# Patient Record
Sex: FEMALE | Race: WHITE | NOT HISPANIC OR LATINO | Employment: FULL TIME | URBAN - METROPOLITAN AREA
[De-identification: names, ages, dates, MRNs, and addresses within clinical notes are randomized per-mention and may not be internally consistent; named-entity substitution may affect disease eponyms.]

---

## 2017-01-30 ENCOUNTER — TRANSCRIBE ORDERS (OUTPATIENT)
Dept: URGENT CARE | Age: 37
End: 2017-01-30

## 2017-01-30 ENCOUNTER — OFFICE VISIT (OUTPATIENT)
Dept: URGENT CARE | Age: 37
End: 2017-01-30
Payer: COMMERCIAL

## 2017-01-30 ENCOUNTER — APPOINTMENT (OUTPATIENT)
Dept: LAB | Age: 37
End: 2017-01-30
Payer: COMMERCIAL

## 2017-01-30 DIAGNOSIS — R68.89 OTHER GENERAL SYMPTOMS AND SIGNS: ICD-10-CM

## 2017-01-30 PROCEDURE — S9083 URGENT CARE CENTER GLOBAL: HCPCS | Performed by: FAMILY MEDICINE

## 2017-01-30 PROCEDURE — 87798 DETECT AGENT NOS DNA AMP: CPT

## 2017-01-30 PROCEDURE — G0382 LEV 3 HOSP TYPE B ED VISIT: HCPCS | Performed by: FAMILY MEDICINE

## 2017-01-31 ENCOUNTER — GENERIC CONVERSION - ENCOUNTER (OUTPATIENT)
Dept: OTHER | Facility: OTHER | Age: 37
End: 2017-01-31

## 2017-01-31 LAB
FLUAV AG SPEC QL: DETECTED
FLUBV AG SPEC QL: ABNORMAL
RSV B RNA SPEC QL NAA+PROBE: ABNORMAL

## 2017-05-03 ENCOUNTER — HOSPITAL ENCOUNTER (OUTPATIENT)
Dept: MAMMOGRAPHY | Facility: MEDICAL CENTER | Age: 37
Discharge: HOME | End: 2017-05-03
Payer: COMMERCIAL

## 2017-05-03 DIAGNOSIS — Z80.3 FAMILY HISTORY OF MALIGNANT NEOPLASM OF BREAST: ICD-10-CM

## 2017-05-03 DIAGNOSIS — Z12.31 ENCOUNTER FOR SCREENING MAMMOGRAM FOR MALIGNANT NEOPLASM OF BREAST: ICD-10-CM

## 2017-05-03 DIAGNOSIS — R92.2 INCONCLUSIVE MAMMOGRAM: ICD-10-CM

## 2017-05-03 PROCEDURE — G0202 SCR MAMMO BI INCL CAD: HCPCS

## 2017-05-03 PROCEDURE — 77063 BREAST TOMOSYNTHESIS BI: CPT

## 2017-05-13 ENCOUNTER — APPOINTMENT (EMERGENCY)
Dept: RADIOLOGY | Facility: HOSPITAL | Age: 37
End: 2017-05-13
Payer: COMMERCIAL

## 2017-05-13 ENCOUNTER — HOSPITAL ENCOUNTER (EMERGENCY)
Facility: HOSPITAL | Age: 37
Discharge: HOME | End: 2017-05-13
Attending: EMERGENCY MEDICINE | Admitting: EMERGENCY MEDICINE
Payer: COMMERCIAL

## 2017-05-13 VITALS
HEART RATE: 71 BPM | SYSTOLIC BLOOD PRESSURE: 98 MMHG | DIASTOLIC BLOOD PRESSURE: 53 MMHG | TEMPERATURE: 98.1 F | BODY MASS INDEX: 23.82 KG/M2 | RESPIRATION RATE: 18 BRPM | OXYGEN SATURATION: 98 % | HEIGHT: 65 IN | WEIGHT: 143 LBS

## 2017-05-13 DIAGNOSIS — R10.9 ABDOMINAL PAIN: Primary | ICD-10-CM

## 2017-05-13 DIAGNOSIS — N93.9 VAGINAL BLEEDING: ICD-10-CM

## 2017-05-13 LAB
ANION GAP BLD CALC-SCNC: 19 MMOL/L (ref 4–13)
BACTERIA UR QL AUTO: ABNORMAL /HPF
BILIRUB UR QL STRIP: NEGATIVE
BUN BLD-MCNC: 15 MG/DL (ref 5–25)
CA-I BLD-SCNC: 1.12 MMOL/L (ref 1.12–1.32)
CHLORIDE BLD-SCNC: 105 MMOL/L (ref 100–108)
CLARITY UR: CLEAR
COLOR UR: YELLOW
CREAT BLD-MCNC: 0.6 MG/DL (ref 0.6–1.3)
GFR SERPL CREATININE-BSD FRML MDRD: >60 ML/MIN/1.73SQ M
GLUCOSE SERPL-MCNC: 97 MG/DL (ref 65–140)
GLUCOSE UR STRIP-MCNC: NEGATIVE MG/DL
HCG UR QL: NORMAL
HCT VFR BLD CALC: 41 % (ref 34.8–46.1)
HGB BLDA-MCNC: 13.9 G/DL (ref 11.5–15.4)
HGB UR QL STRIP.AUTO: ABNORMAL
KETONES UR STRIP-MCNC: NEGATIVE MG/DL
LEUKOCYTE ESTERASE UR QL STRIP: NEGATIVE
NITRITE UR QL STRIP: NEGATIVE
NON-SQ EPI CELLS URNS QL MICRO: ABNORMAL /HPF
PCO2 BLD: 23 MMOL/L (ref 21–32)
PH UR STRIP.AUTO: 5.5 [PH] (ref 4.5–8)
POTASSIUM BLD-SCNC: 3.7 MMOL/L (ref 3.5–5.3)
PROT UR STRIP-MCNC: NEGATIVE MG/DL
RBC #/AREA URNS AUTO: ABNORMAL /HPF
SODIUM BLD-SCNC: 141 MMOL/L (ref 136–145)
SP GR UR STRIP.AUTO: 1.02 (ref 1–1.03)
SPECIMEN SOURCE: ABNORMAL
UROBILINOGEN UR QL STRIP.AUTO: 0.2 E.U./DL
WBC #/AREA URNS AUTO: ABNORMAL /HPF

## 2017-05-13 PROCEDURE — 81025 URINE PREGNANCY TEST: CPT | Performed by: EMERGENCY MEDICINE

## 2017-05-13 PROCEDURE — 87591 N.GONORRHOEAE DNA AMP PROB: CPT | Performed by: EMERGENCY MEDICINE

## 2017-05-13 PROCEDURE — 99284 EMERGENCY DEPT VISIT MOD MDM: CPT

## 2017-05-13 PROCEDURE — 81001 URINALYSIS AUTO W/SCOPE: CPT

## 2017-05-13 PROCEDURE — 93975 VASCULAR STUDY: CPT

## 2017-05-13 PROCEDURE — 87491 CHLMYD TRACH DNA AMP PROBE: CPT | Performed by: EMERGENCY MEDICINE

## 2017-05-13 PROCEDURE — 80047 BASIC METABLC PNL IONIZED CA: CPT

## 2017-05-13 PROCEDURE — 76856 US EXAM PELVIC COMPLETE: CPT

## 2017-05-13 PROCEDURE — 85014 HEMATOCRIT: CPT

## 2017-05-13 PROCEDURE — 76830 TRANSVAGINAL US NON-OB: CPT

## 2017-05-13 RX ORDER — LEVOTHYROXINE SODIUM 0.1 MG/1
100 TABLET ORAL DAILY
COMMUNITY

## 2017-05-13 RX ORDER — IBUPROFEN 600 MG/1
600 TABLET ORAL ONCE
Status: COMPLETED | OUTPATIENT
Start: 2017-05-13 | End: 2017-05-13

## 2017-05-13 RX ORDER — MELATONIN
1000 DAILY
COMMUNITY

## 2017-05-13 RX ADMIN — IBUPROFEN 600 MG: 600 TABLET, FILM COATED ORAL at 14:22

## 2017-05-15 ENCOUNTER — GENERIC CONVERSION - ENCOUNTER (OUTPATIENT)
Dept: OTHER | Facility: OTHER | Age: 37
End: 2017-05-15

## 2017-05-15 LAB
CHLAMYDIA DNA CVX QL NAA+PROBE: NORMAL
N GONORRHOEA DNA GENITAL QL NAA+PROBE: NORMAL

## 2017-05-18 ENCOUNTER — ALLSCRIPTS OFFICE VISIT (OUTPATIENT)
Dept: OTHER | Facility: OTHER | Age: 37
End: 2017-05-18

## 2017-05-18 DIAGNOSIS — N92.6 IRREGULAR MENSTRUATION: ICD-10-CM

## 2017-05-23 ENCOUNTER — GENERIC CONVERSION - ENCOUNTER (OUTPATIENT)
Dept: OTHER | Facility: OTHER | Age: 37
End: 2017-05-23

## 2017-05-25 ENCOUNTER — APPOINTMENT (EMERGENCY)
Dept: CT IMAGING | Facility: HOSPITAL | Age: 37
End: 2017-05-25
Payer: COMMERCIAL

## 2017-05-25 ENCOUNTER — HOSPITAL ENCOUNTER (EMERGENCY)
Facility: HOSPITAL | Age: 37
Discharge: HOME | End: 2017-05-25
Attending: EMERGENCY MEDICINE
Payer: COMMERCIAL

## 2017-05-25 VITALS
DIASTOLIC BLOOD PRESSURE: 58 MMHG | WEIGHT: 150 LBS | SYSTOLIC BLOOD PRESSURE: 105 MMHG | BODY MASS INDEX: 24.96 KG/M2 | HEART RATE: 81 BPM | TEMPERATURE: 98.3 F | RESPIRATION RATE: 18 BRPM | OXYGEN SATURATION: 100 %

## 2017-05-25 DIAGNOSIS — R10.30 LOWER ABDOMINAL PAIN: Primary | ICD-10-CM

## 2017-05-25 LAB
ANION GAP SERPL CALCULATED.3IONS-SCNC: 9 MMOL/L (ref 4–13)
BACTERIA UR QL AUTO: ABNORMAL /HPF
BASOPHILS # BLD AUTO: 0.03 THOUSANDS/ΜL (ref 0–0.1)
BASOPHILS NFR BLD AUTO: 0 % (ref 0–1)
BILIRUB UR QL STRIP: NEGATIVE
BUN SERPL-MCNC: 14 MG/DL (ref 5–25)
CALCIUM SERPL-MCNC: 8.6 MG/DL (ref 8.3–10.1)
CHLORIDE SERPL-SCNC: 103 MMOL/L (ref 100–108)
CLARITY UR: CLEAR
CLARITY, POC: CLEAR
CO2 SERPL-SCNC: 28 MMOL/L (ref 21–32)
COLOR UR: YELLOW
COLOR, POC: YELLOW
CREAT SERPL-MCNC: 0.86 MG/DL (ref 0.6–1.3)
EOSINOPHIL # BLD AUTO: 0.07 THOUSAND/ΜL (ref 0–0.61)
EOSINOPHIL NFR BLD AUTO: 1 % (ref 0–6)
ERYTHROCYTE [DISTWIDTH] IN BLOOD BY AUTOMATED COUNT: 12.7 % (ref 11.6–15.1)
EXT BILIRUBIN, UA: NEGATIVE
EXT BLOOD URINE: NORMAL
EXT GLUCOSE, UA: NEGATIVE
EXT KETONES: NEGATIVE
EXT NITRITE, UA: NEGATIVE
EXT PH, UA: 6.5
EXT PROTEIN, UA: NEGATIVE
EXT SPECIFIC GRAVITY, UA: 1.01
EXT UROBILINOGEN: NEGATIVE
GFR SERPL CREATININE-BSD FRML MDRD: >60 ML/MIN/1.73SQ M
GLUCOSE SERPL-MCNC: 93 MG/DL (ref 65–140)
GLUCOSE UR STRIP-MCNC: NEGATIVE MG/DL
HCT VFR BLD AUTO: 39.2 % (ref 34.8–46.1)
HGB BLD-MCNC: 13.3 G/DL (ref 11.5–15.4)
HGB UR QL STRIP.AUTO: ABNORMAL
KETONES UR STRIP-MCNC: NEGATIVE MG/DL
LEUKOCYTE ESTERASE UR QL STRIP: NEGATIVE
LYMPHOCYTES # BLD AUTO: 2.23 THOUSANDS/ΜL (ref 0.6–4.47)
LYMPHOCYTES NFR BLD AUTO: 25 % (ref 14–44)
MCH RBC QN AUTO: 30.5 PG (ref 26.8–34.3)
MCHC RBC AUTO-ENTMCNC: 33.9 G/DL (ref 31.4–37.4)
MCV RBC AUTO: 90 FL (ref 82–98)
MONOCYTES # BLD AUTO: 0.63 THOUSAND/ΜL (ref 0.17–1.22)
MONOCYTES NFR BLD AUTO: 7 % (ref 4–12)
NEUTROPHILS # BLD AUTO: 6.11 THOUSANDS/ΜL (ref 1.85–7.62)
NEUTS SEG NFR BLD AUTO: 67 % (ref 43–75)
NITRITE UR QL STRIP: NEGATIVE
NON-SQ EPI CELLS URNS QL MICRO: ABNORMAL /HPF
PH UR STRIP.AUTO: 6 [PH] (ref 4.5–8)
PLATELET # BLD AUTO: 238 THOUSANDS/UL (ref 149–390)
PMV BLD AUTO: 10.5 FL (ref 8.9–12.7)
POTASSIUM SERPL-SCNC: 3.6 MMOL/L (ref 3.5–5.3)
PROT UR STRIP-MCNC: NEGATIVE MG/DL
RBC # BLD AUTO: 4.36 MILLION/UL (ref 3.81–5.12)
RBC #/AREA URNS AUTO: ABNORMAL /HPF
SODIUM SERPL-SCNC: 140 MMOL/L (ref 136–145)
SP GR UR STRIP.AUTO: <=1.005 (ref 1–1.03)
UROBILINOGEN UR QL STRIP.AUTO: 0.2 E.U./DL
WBC # BLD AUTO: 9.07 THOUSAND/UL (ref 4.31–10.16)
WBC # BLD EST: NEGATIVE 10*3/UL
WBC #/AREA URNS AUTO: ABNORMAL /HPF

## 2017-05-25 PROCEDURE — 87086 URINE CULTURE/COLONY COUNT: CPT | Performed by: EMERGENCY MEDICINE

## 2017-05-25 PROCEDURE — 36415 COLL VENOUS BLD VENIPUNCTURE: CPT | Performed by: EMERGENCY MEDICINE

## 2017-05-25 PROCEDURE — 81001 URINALYSIS AUTO W/SCOPE: CPT | Performed by: EMERGENCY MEDICINE

## 2017-05-25 PROCEDURE — 74177 CT ABD & PELVIS W/CONTRAST: CPT

## 2017-05-25 PROCEDURE — 85025 COMPLETE CBC W/AUTO DIFF WBC: CPT | Performed by: EMERGENCY MEDICINE

## 2017-05-25 PROCEDURE — 80048 BASIC METABOLIC PNL TOTAL CA: CPT | Performed by: EMERGENCY MEDICINE

## 2017-05-25 PROCEDURE — 81002 URINALYSIS NONAUTO W/O SCOPE: CPT | Performed by: EMERGENCY MEDICINE

## 2017-05-25 PROCEDURE — 99284 EMERGENCY DEPT VISIT MOD MDM: CPT

## 2017-05-25 RX ORDER — SULFAMETHOXAZOLE AND TRIMETHOPRIM 800; 160 MG/1; MG/1
1 TABLET ORAL 2 TIMES DAILY
Qty: 6 TABLET | Refills: 0 | Status: SHIPPED | OUTPATIENT
Start: 2017-05-25 | End: 2017-05-28

## 2017-05-25 RX ORDER — ONDANSETRON 4 MG/1
4 TABLET, ORALLY DISINTEGRATING ORAL ONCE
Status: COMPLETED | OUTPATIENT
Start: 2017-05-25 | End: 2017-05-25

## 2017-05-25 RX ADMIN — ONDANSETRON 4 MG: 4 TABLET, ORALLY DISINTEGRATING ORAL at 16:30

## 2017-05-25 RX ADMIN — IOHEXOL 100 ML: 350 INJECTION, SOLUTION INTRAVENOUS at 17:24

## 2017-05-26 ENCOUNTER — GENERIC CONVERSION - ENCOUNTER (OUTPATIENT)
Dept: OTHER | Facility: OTHER | Age: 37
End: 2017-05-26

## 2017-05-26 LAB — BACTERIA UR CULT: NORMAL

## 2017-07-24 ENCOUNTER — ALLSCRIPTS OFFICE VISIT (OUTPATIENT)
Dept: OTHER | Facility: OTHER | Age: 37
End: 2017-07-24

## 2017-07-24 ENCOUNTER — LAB REQUISITION (OUTPATIENT)
Dept: LAB | Facility: HOSPITAL | Age: 37
End: 2017-07-24
Payer: COMMERCIAL

## 2017-07-24 DIAGNOSIS — Z01.419 ENCOUNTER FOR GYNECOLOGICAL EXAMINATION WITHOUT ABNORMAL FINDING: ICD-10-CM

## 2017-07-24 PROCEDURE — G0145 SCR C/V CYTO,THINLAYER,RESCR: HCPCS | Performed by: NURSE PRACTITIONER

## 2017-07-27 LAB
LAB AP GYN PRIMARY INTERPRETATION: NORMAL
LAB AP LMP: NORMAL
Lab: NORMAL

## 2017-07-31 ENCOUNTER — LAB REQUISITION (OUTPATIENT)
Dept: LAB | Facility: HOSPITAL | Age: 37
End: 2017-07-31
Payer: COMMERCIAL

## 2017-07-31 DIAGNOSIS — Z11.51 ENCOUNTER FOR SCREENING FOR HUMAN PAPILLOMAVIRUS (HPV): ICD-10-CM

## 2017-07-31 DIAGNOSIS — Z01.419 ENCOUNTER FOR GYNECOLOGICAL EXAMINATION WITHOUT ABNORMAL FINDING: ICD-10-CM

## 2017-07-31 PROCEDURE — 87624 HPV HI-RISK TYP POOLED RSLT: CPT | Performed by: NURSE PRACTITIONER

## 2017-08-02 LAB — HPV I/H RISK 1 DNA CVX QL PROBE+SIG AMP: NORMAL

## 2017-09-06 ENCOUNTER — ALLSCRIPTS OFFICE VISIT (OUTPATIENT)
Dept: OTHER | Facility: OTHER | Age: 37
End: 2017-09-06

## 2017-09-14 ENCOUNTER — GENERIC CONVERSION - ENCOUNTER (OUTPATIENT)
Dept: OTHER | Facility: OTHER | Age: 37
End: 2017-09-14

## 2017-09-14 DIAGNOSIS — N63.0 BREAST LUMP: ICD-10-CM

## 2017-09-15 ENCOUNTER — CONVERSION ENCOUNTER (OUTPATIENT)
Dept: MAMMOGRAPHY | Facility: CLINIC | Age: 37
End: 2017-09-15

## 2017-09-15 ENCOUNTER — HOSPITAL ENCOUNTER (OUTPATIENT)
Dept: MAMMOGRAPHY | Facility: CLINIC | Age: 37
Discharge: HOME | End: 2017-09-15
Payer: COMMERCIAL

## 2017-09-15 ENCOUNTER — HOSPITAL ENCOUNTER (OUTPATIENT)
Dept: ULTRASOUND IMAGING | Facility: CLINIC | Age: 37
Discharge: HOME | End: 2017-09-15
Payer: COMMERCIAL

## 2017-09-15 DIAGNOSIS — N63.0 BREAST LUMP: ICD-10-CM

## 2017-09-15 DIAGNOSIS — N63.0 LUMP OR MASS IN BREAST: ICD-10-CM

## 2017-09-15 PROCEDURE — G0206 DX MAMMO INCL CAD UNI: HCPCS

## 2017-09-15 PROCEDURE — 76642 ULTRASOUND BREAST LIMITED: CPT

## 2017-09-15 PROCEDURE — G0279 TOMOSYNTHESIS, MAMMO: HCPCS

## 2017-09-15 RX ORDER — FLUOXETINE HYDROCHLORIDE 20 MG/1
20 CAPSULE ORAL DAILY
COMMUNITY

## 2017-09-18 ENCOUNTER — HOSPITAL ENCOUNTER (OUTPATIENT)
Dept: ULTRASOUND IMAGING | Facility: CLINIC | Age: 37
Discharge: HOME | End: 2017-09-18
Payer: COMMERCIAL

## 2017-09-18 ENCOUNTER — HOSPITAL ENCOUNTER (OUTPATIENT)
Dept: ULTRASOUND IMAGING | Facility: CLINIC | Age: 37
Discharge: HOME | End: 2017-09-18
Admitting: RADIOLOGY
Payer: COMMERCIAL

## 2017-09-18 ENCOUNTER — GENERIC CONVERSION - ENCOUNTER (OUTPATIENT)
Dept: OTHER | Facility: OTHER | Age: 37
End: 2017-09-18

## 2017-09-18 ENCOUNTER — HOSPITAL ENCOUNTER (OUTPATIENT)
Dept: MAMMOGRAPHY | Facility: CLINIC | Age: 37
Discharge: HOME | End: 2017-09-18
Payer: COMMERCIAL

## 2017-09-18 VITALS — HEART RATE: 76 BPM | SYSTOLIC BLOOD PRESSURE: 100 MMHG | DIASTOLIC BLOOD PRESSURE: 60 MMHG

## 2017-09-18 DIAGNOSIS — N63.0 LUMP OR MASS IN BREAST: ICD-10-CM

## 2017-09-18 PROCEDURE — 88367 INSITU HYBRIDIZATION AUTO: CPT | Performed by: OBSTETRICS & GYNECOLOGY

## 2017-09-18 PROCEDURE — 88341 IMHCHEM/IMCYTCHM EA ADD ANTB: CPT | Performed by: OBSTETRICS & GYNECOLOGY

## 2017-09-18 PROCEDURE — 88361 TUMOR IMMUNOHISTOCHEM/COMPUT: CPT | Performed by: OBSTETRICS & GYNECOLOGY

## 2017-09-18 PROCEDURE — 88342 IMHCHEM/IMCYTCHM 1ST ANTB: CPT | Performed by: OBSTETRICS & GYNECOLOGY

## 2017-09-18 PROCEDURE — 88305 TISSUE EXAM BY PATHOLOGIST: CPT | Performed by: OBSTETRICS & GYNECOLOGY

## 2017-09-18 PROCEDURE — 19083 BX BREAST 1ST LESION US IMAG: CPT

## 2017-09-18 RX ORDER — LIDOCAINE HYDROCHLORIDE 10 MG/ML
4 INJECTION, SOLUTION INFILTRATION; PERINEURAL ONCE
Status: COMPLETED | OUTPATIENT
Start: 2017-09-18 | End: 2017-09-18

## 2017-09-18 RX ADMIN — LIDOCAINE HYDROCHLORIDE 4 ML: 10 INJECTION, SOLUTION INFILTRATION; PERINEURAL at 14:34

## 2017-09-21 ENCOUNTER — APPOINTMENT (OUTPATIENT)
Dept: LAB | Facility: CLINIC | Age: 37
End: 2017-09-21
Payer: COMMERCIAL

## 2017-09-21 ENCOUNTER — ALLSCRIPTS OFFICE VISIT (OUTPATIENT)
Dept: OTHER | Facility: OTHER | Age: 37
End: 2017-09-21

## 2017-09-21 ENCOUNTER — TRANSCRIBE ORDERS (OUTPATIENT)
Dept: LAB | Facility: CLINIC | Age: 37
End: 2017-09-21

## 2017-09-21 DIAGNOSIS — C50.411 MALIGNANT NEOPLASM OF UPPER-OUTER QUADRANT OF RIGHT FEMALE BREAST (HCC): Primary | ICD-10-CM

## 2017-09-21 DIAGNOSIS — C50.411 MALIGNANT NEOPLASM OF UPPER-OUTER QUADRANT OF RIGHT FEMALE BREAST (HCC): ICD-10-CM

## 2017-09-21 PROCEDURE — 36415 COLL VENOUS BLD VENIPUNCTURE: CPT

## 2017-09-25 LAB — MISCELLANEOUS LAB TEST RESULT: NORMAL

## 2017-09-28 ENCOUNTER — APPOINTMENT (OUTPATIENT)
Dept: RADIOLOGY | Facility: CLINIC | Age: 37
End: 2017-09-28
Payer: COMMERCIAL

## 2017-09-28 ENCOUNTER — GENERIC CONVERSION - ENCOUNTER (OUTPATIENT)
Dept: OTHER | Facility: OTHER | Age: 37
End: 2017-09-28

## 2017-09-28 ENCOUNTER — APPOINTMENT (OUTPATIENT)
Dept: LAB | Facility: CLINIC | Age: 37
End: 2017-09-28
Payer: COMMERCIAL

## 2017-09-28 DIAGNOSIS — C50.411 MALIGNANT NEOPLASM OF UPPER-OUTER QUADRANT OF RIGHT FEMALE BREAST (HCC): ICD-10-CM

## 2017-09-28 LAB
ALBUMIN SERPL BCP-MCNC: 4 G/DL (ref 3.5–5)
ALP SERPL-CCNC: 77 U/L (ref 46–116)
ALT SERPL W P-5'-P-CCNC: 29 U/L (ref 12–78)
ANION GAP SERPL CALCULATED.3IONS-SCNC: 7 MMOL/L (ref 4–13)
AST SERPL W P-5'-P-CCNC: 16 U/L (ref 5–45)
BASOPHILS # BLD AUTO: 0.03 THOUSANDS/ΜL (ref 0–0.1)
BASOPHILS NFR BLD AUTO: 0 % (ref 0–1)
BILIRUB SERPL-MCNC: 0.4 MG/DL (ref 0.2–1)
BUN SERPL-MCNC: 17 MG/DL (ref 5–25)
CALCIUM SERPL-MCNC: 8.9 MG/DL (ref 8.3–10.1)
CHLORIDE SERPL-SCNC: 103 MMOL/L (ref 100–108)
CO2 SERPL-SCNC: 27 MMOL/L (ref 21–32)
CREAT SERPL-MCNC: 0.82 MG/DL (ref 0.6–1.3)
EOSINOPHIL # BLD AUTO: 0.06 THOUSAND/ΜL (ref 0–0.61)
EOSINOPHIL NFR BLD AUTO: 1 % (ref 0–6)
ERYTHROCYTE [DISTWIDTH] IN BLOOD BY AUTOMATED COUNT: 12.5 % (ref 11.6–15.1)
GFR SERPL CREATININE-BSD FRML MDRD: 92 ML/MIN/1.73SQ M
GLUCOSE SERPL-MCNC: 103 MG/DL (ref 65–140)
HCT VFR BLD AUTO: 41.6 % (ref 34.8–46.1)
HGB BLD-MCNC: 13.8 G/DL (ref 11.5–15.4)
LYMPHOCYTES # BLD AUTO: 1.4 THOUSANDS/ΜL (ref 0.6–4.47)
LYMPHOCYTES NFR BLD AUTO: 19 % (ref 14–44)
MCH RBC QN AUTO: 29.8 PG (ref 26.8–34.3)
MCHC RBC AUTO-ENTMCNC: 33.2 G/DL (ref 31.4–37.4)
MCV RBC AUTO: 90 FL (ref 82–98)
MONOCYTES # BLD AUTO: 0.52 THOUSAND/ΜL (ref 0.17–1.22)
MONOCYTES NFR BLD AUTO: 7 % (ref 4–12)
NEUTROPHILS # BLD AUTO: 5.36 THOUSANDS/ΜL (ref 1.85–7.62)
NEUTS SEG NFR BLD AUTO: 73 % (ref 43–75)
PLATELET # BLD AUTO: 252 THOUSANDS/UL (ref 149–390)
PMV BLD AUTO: 10.5 FL (ref 8.9–12.7)
POTASSIUM SERPL-SCNC: 3.9 MMOL/L (ref 3.5–5.3)
PROT SERPL-MCNC: 7.4 G/DL (ref 6.4–8.2)
RBC # BLD AUTO: 4.63 MILLION/UL (ref 3.81–5.12)
SODIUM SERPL-SCNC: 137 MMOL/L (ref 136–145)
WBC # BLD AUTO: 7.37 THOUSAND/UL (ref 4.31–10.16)

## 2017-09-28 PROCEDURE — 71020 HB CHEST X-RAY 2VW FRONTAL&LATL: CPT

## 2017-09-28 PROCEDURE — 85025 COMPLETE CBC W/AUTO DIFF WBC: CPT

## 2017-09-28 PROCEDURE — 80053 COMPREHEN METABOLIC PANEL: CPT

## 2017-09-28 PROCEDURE — 36415 COLL VENOUS BLD VENIPUNCTURE: CPT

## 2017-10-03 ENCOUNTER — GENERIC CONVERSION - ENCOUNTER (OUTPATIENT)
Dept: OTHER | Facility: OTHER | Age: 37
End: 2017-10-03

## 2017-10-04 NOTE — PROGRESS NOTES
Discussion/Summary  Discussion Summary:   Genetic Testing Results  testing indicated that the patient carries 2 Variants of Uncertain Significance (VUS): BRCA2 c 241T>A; BRCA2 c 7669G>A  testing results are NEGATIVE for mutations and large rearrangements in: ARELY, BRCA1, CDH1, CHEK2, PALB2, PTEN, STK11, and TP53 ([facility] Breast [facility] STAT panel)    testing results were disclosed to the patient  We discussed that 2 Variants of Uncertain Significance (VUS) were found in the BRCA2 gene  A VUS is neither a positive nor a negative result  At this time there is not enough information available about these particular changes to determine if they are associated with an increased risk for developing cancer  in BRCA2 are associated with Hereditary Breast and Ovarian Cancer syndrome (HBOC)  Women who carry BRCA2 mutations are at increased risk to develop breast and ovarian cancer  Risk of male breast cancer, prostate cancer, pancreatic cancer, and melanoma are also elevated  laboratory will continue to gather more data on further observations of these variants in the hope of being able to reclassify each as a benign gene change (polymorphism) or harmful mutation which would be associated with an increased risk for cancer  Many VUS are found to be harmless once more data is collected  It is important to keep in mind that reclassification may take years  If the variant is reclassified, an updated report will be sent by the laboratory  The patient was encouraged to maintain current contact information so that she can be notified of any updated information  the variant is reclassified, clinical management should be based on the patients personal and family history  Increased surveillance may be indicated, however prophylactic surgery is not generally recommend based on a VUS   In addition, genetic testing for this VUS is not recommended for family members since it is unclear whether the result is significant or not   pathogenic mutations were identified in any of the additional hereditary breast cancer genes tested  We discussed that these results do not explain the history of cancer in the family, and it is possible that an unidentifiable mutation or a mutation in another gene(s) is leading to an increased risk of cancer in the family  Therefore it is important that she and her family members maintain regular cancer screening as directed by their physicians  The patients questions were answered and she was encouraged to call with any future questions or concerns  Signatures   Electronically signed by :  Manish Castro, [facility]; Oct  3 2017  3:21PM EST                       (Author)

## 2017-10-04 NOTE — PROGRESS NOTES
Discussion/Summary  Discussion Summary:   Genetic testing pending, estimated turn-around time: 2 weeks  patient was seen for genetic counseling regarding possible genetic testing, relative to her recent diangosis of breast cancer  information was reviewed and a three generation pedigree drawn  The was recently diagnosed with breast cancer at the age of 40  The patients family history is significant for her paternal grandmother who was diagnosed with breast cancer at 62, a maternal aunt who was diagnosed with breast cancer at 59, a maternal uncle who was diagnosed with prostate cancer at 58, her maternal grandfather who ha colon cancer at 71 and melanoma in his 62s, and her maternal grandmother who had colon cancer at 80  Please see pedigree for additional family history details  reviewed the genetics of cancer, hereditary and familial risks, and the main benefits and limitation of genetic testing for susceptibility to cancer  We discussed genes associated with hereditary breast cancer including BRCA1 and BRCA2  We reviewed cancer risks associated with these genes, options for medical management, and potential risks for family members  Testing:reviewed the genetic testing process, insurance concerns, and possible results  The patient elected to pursue genetic testing for genes associated with hereditary breast cancer  Informed consent was obtained and a DNA sample was collected  The sample was sent to [facility] for the STAT breast cancer panel  Test results should be available in approximately 2 weeks  The patient elected to have results disclosed by phone and she will be contacted once results are available  Signatures   Electronically signed by :  Unruly Hernandez, [facility]; Oct  3 2017  3:37PM EST                       (Author)

## 2017-10-05 ENCOUNTER — GENERIC CONVERSION - ENCOUNTER (OUTPATIENT)
Dept: OTHER | Facility: OTHER | Age: 37
End: 2017-10-05

## 2018-01-09 NOTE — MISCELLANEOUS
Message   Recorded as Task   Date: 05/26/2017 11:41 AM, Created By: Ilda Evans   Task Name: Care Coordination   Assigned To: Ofelia William   Regarding Patient: Sahara Leavitt, Status: In Progress   Yovany Goldsmith - 26 May 2017 11:41 AM     TASK CREATED  Pt LMOM with the answering service requesting a c/b from a nurse  Romana Alonso - 26 May 2017 11:47 AM     TASK IN PROGRESS   Romana Alonso - 26 May 2017 11:48 AM     TASK EDITED  problem was resolved yesterday        Active Problems    1  Abnormal uterine bleeding (626 9) (N93 9)   2  Anxiety (300 00) (F41 9)   3  Cervical disc disease (722 91) (M50 90)   4  Dense breast tissue (793 82) (R92 2)   5  Dyspareunia (625 0)   6  Hypothyroidism (244 9) (E03 9)   7  Irregular menses (626 4) (N92 6)   8  Lump or mass in breast (611 72) (N63)   9  Paresthesia (782 0) (R20 2)   10  Postcoital bleeding (626 7) (N93 0)   11  Tingling in extremities (782 0) (R20 2)    Current Meds   1  Levothyroxine Sodium 100 MCG Oral Tablet; TAKE 1 TABLET DAILY; Therapy: (Recorded:14Jan2016) to Recorded   2  LORazepam 0 5 MG Oral Tablet; TAKE 1 TABLET EVERY 12 HOURS AS NEEDED; Therapy: 76JMQ2660 to (Evaluate:01Jun2017); Last Rx:34Ulb2323 Ordered   3  Vitamin D 2000 UNIT Oral Tablet; take 2 tab daily; Therapy: (Recorded:14Jan2016) to Recorded    Allergies    1  Advil TABS   2  Latex Exam Gloves MISC   3   Aleve TABS    Signatures   Electronically signed by : Leyla Norton, ; May 26 2017 11:48AM EST                       (Author)

## 2018-01-09 NOTE — MISCELLANEOUS
Message   Recorded as Task   Date: 05/15/2017 08:21 AM, Created By: Kassidy Douglas   Task Name: Medical Complaint Callback   Assigned To: Basilio De Jesus   Regarding Patient: Fuentes Orr, Status: In Progress   Comment:    Birdie Lema - 15 May 2017 8:21 AM     TASK CREATED  Caller: Self; Medical Complaint; [phone] (Home)  Pt was in ER [facility] over the weekend  Albertina Lightning Albertina Lightning pt was bleeding very heavily and not due for menses for 10 days  Pt states they ran tests and told her to F/U w us today  Pt is not sure if she needs to be seen  Pt stated that she is due for Pap in july but wanted to do it early if possible bc she is concerned w her bleeding  Pt states that she is still bleeding and filling pads every few hours  Pt is not currently on any [facility]   Jessica Ramirez - 15 May 2017 8:31 AM     TASK IN PROGRESS   Jessica Ramirez - 15 May 2017 8:46 AM     TASK EDITED  Spoke with pt - She woke up on Saturday feeling really bloated and had severe cramp pain  Went to bathroom at one point in the morning and noticed a lot of blood in toilet  Eventually went to the ER on Saturday because of the pain - had pelvic u/s done, b/w and a HPT- negative and found nothing  They wouldn't do a pap smear  Patient states she is not due for her period for another 10 days  Did mention she is under a lot of stress  Patient is worried  She is Dr Burgess Fisher patient but she is away  Stated she used to see KTM, so sent this to her  Please advise  Thanks! Jessica Ramirez - 15 May 2017 8:57 AM     TASK EDITED  She did have intercourse on Saturday morning and is not on any birth control  Joe Shah - 15 May 2017 12:52 PM     TASK REPLIED TO: Previously Assigned To Joe Shah  if she is still in pain she should have an office visit   Jessica Ramirez - 15 May 2017 1:02 PM     TASK EDITED  Called pt back - made apt for her for 05/18 @5pm with RI7   She had a transvaginal u/s on 05/13 - results not yet back, also did GC & Chlamydia - they were negative  Wanted an appt  Active Problems    1  Abnormal uterine bleeding (626 9) (N93 9)   2  Cervical disc disease (722 91) (M50 90)   3  Dense breast tissue (793 82) (R92 2)   4  Dyspareunia (625 0)   5  Encounter for gynecological examination without abnormal finding (V72 31) (Z01 419)   6  Encounter for screening mammogram for malignant neoplasm of breast (V76 12)   (Z12 31)   7  Flu-like symptoms (780 99) (R68 89)   8  Hypothyroidism (244 9) (E03 9)   9  Lump or mass in breast (611 72) (N63)   10  Paresthesia (782 0) (R20 2)   11  Postcoital bleeding (626 7) (N93 0)   12  Tingling in extremities (782 0) (R20 2)   13  Urinary tract infection (599 0) (N39 0)    Current Meds   1  Levothyroxine Sodium 100 MCG Oral Tablet; TAKE 1 TABLET DAILY; Therapy: (Recorded:14Jan2016) to Recorded   2  Oseltamivir Phosphate 75 MG Oral Capsule (Tamiflu); TAKE 1 CAPSULE TWICE DAILY   WITH MEALS; Therapy: 48DWP7749 to (Evaluate:11Ekr6318)  Requested for: 22ERV5084; Last   Rx:30Jan2017 Ordered   3  Vitamin D 2000 UNIT Oral Tablet; take 2 tab daily; Therapy: (Recorded:14Jan2016) to Recorded    Allergies    1  Advil TABS   2  Latex Exam Gloves MISC   3   Aleve TABS    Signatures   Electronically signed by : Rasheeda Corea, ; May 15 2017  1:02PM EST                       (Author)

## 2018-01-10 NOTE — RESULT NOTES
Message  Folate > 23, RF <10, B12 746, SSA, SSB normal  SPEP showed no monoclonal protein  MRI T-spine showed a very mild, early degenerative change noted at T6-7 and T7-8, normal cord signal    I called the patient and left a message with results and to call back if any questions     Patient is to follow up PRN       Signatures   Electronically signed by : Dagmar Peace MD; Feb 22 2016  9:05AM EST                       (Author)

## 2018-01-11 NOTE — RESULT NOTES
Message   Recorded as Task   Date: 01/15/2016 10:31 AM, Created By: Tyrone Longo   Task Name: Call Back   Assigned To: Naveen Lipscomb   Regarding Patient: Dean Kim, Status: In Progress   Karoline Mclaughlin - 15 Wesley 2016 10:31 AM     TASK CREATED  Caller: Self; [phone] (Home); [phone] (Work)  PT LOOKING FOR [facility] RESULTS DONE AT [facility] CALL [phone]   Taty Alonso - 15 Wesley 2016 10:37 AM     TASK IN PROGRESS   Taty Alonso - 15 Wesley 2016 10:41 AM     TASK EDITED  pt is calling for abs results    informed pt result was benign        Signatures   Electronically signed by : Koki Marie, ; Wesley 15 2016 10:41AM EST                       (Author)

## 2018-01-11 NOTE — MISCELLANEOUS
Message   Recorded as Task   Date: 09/15/2017 04:48 PM, Created By: Bryan Dumont   Task Name: Follow Up   Assigned To: Jevon Keen   Regarding Patient: Britni Foreman, Status: In Progress   Comment:    PorterlewisKathy - 15 Sep 2017 4:48 PM     TASK CREATED  Pt is aware of biopsy recommendation and has an appointment on [address] [place] [address], 9/18/17, for a right u/s guided breast biopsy  Please enter an order into [employer] ASAP  Thank you! Jessica Ramirez - 18 Sep 2017 7:51 AM     TASK IN PROGRESS   Jessica Ramirez - 18 Sep 2017 7:54 AM     TASK EDITED  Twan Jose is available in [employer]  Active Problems    1  Anxiety (300 00) (F41 9)   2  Axillary fullness (782 2) (R22 2)   3  Breast lump on right side at 11 o'clock position (611 72) (N63)   4  Cervical cancer screening (V76 2) (Z12 4)   5  Cervical disc disease (722 91) (M50 90)   6  Dense breast tissue (793 82) (R92 2)   7  Dyspareunia (625 0)   8  Encounter for general counseling and advice on contraceptive management (V25 09)   (Z30 09)   9  Encounter for gynecological examination without abnormal finding (V72 31) (Z01 419)   10  Hypothyroidism (244 9) (E03 9)   11  Irregular menses (626 4) (N92 6)   12  Mass of right breast (611 72) (N63)   13  Pap smear, as part of routine gynecological examination (V76 2) (Z01 419)   14  Paresthesia (782 0) (R20 2)   15  Postcoital bleeding (626 7) (N93 0)   16  Screening for HPV (human papillomavirus) (V73 81) (Z11 51)   17  Tingling in extremities (782 0) (R20 2)    Current Meds   1  FLUoxetine HCl - 20 MG Oral Capsule; Therapy: (Recorded:68Lzf4955) to Recorded   2  Levothyroxine Sodium 100 MCG Oral Tablet; TAKE 1 TABLET DAILY; Therapy: (Recorded:14Jan2016) to Recorded   3  Vitamin D3 5000 UNIT Oral Capsule; Therapy: (Recorded:36Qyo5149) to Recorded    Allergies    1  Latex Exam Gloves MISC   2   Aleve TABS    Plan  Breast lump on right side at 11 o'clock position    · US GUIDED BREAST BIOPSY RIGHT COMPLETE; Status:Hold For -  Scheduling,Retrospective By Protocol Authorization;  Requested [id number];     Signatures   Electronically signed by : Peri Peterson, ; Sep 18 2017  7:54AM EST                       (Author)

## 2018-01-12 VITALS
WEIGHT: 141 LBS | TEMPERATURE: 98.9 F | HEIGHT: 66 IN | DIASTOLIC BLOOD PRESSURE: 60 MMHG | RESPIRATION RATE: 16 BRPM | SYSTOLIC BLOOD PRESSURE: 96 MMHG | HEART RATE: 80 BPM | BODY MASS INDEX: 22.66 KG/M2

## 2018-01-12 VITALS
WEIGHT: 148 LBS | BODY MASS INDEX: 23.78 KG/M2 | DIASTOLIC BLOOD PRESSURE: 62 MMHG | HEIGHT: 66 IN | SYSTOLIC BLOOD PRESSURE: 108 MMHG

## 2018-01-12 VITALS
DIASTOLIC BLOOD PRESSURE: 76 MMHG | BODY MASS INDEX: 24.66 KG/M2 | HEIGHT: 65 IN | WEIGHT: 148 LBS | SYSTOLIC BLOOD PRESSURE: 122 MMHG

## 2018-01-13 NOTE — MISCELLANEOUS
Message   Recorded as Task   Date: 2017 01:44 PM, Created By: Nancy Sharma   Task Name: Medical Complaint Callback   Assigned To: George Fee   Regarding Patient: Stefanie Ziegler, Status: In Progress   ChadMagda Castillo - 25 May 2017 1:44 PM     TASK CREATED  Caller: Self; Medical Complaint; [phone] (Home)  Patient was seen in ER two weekends ago and then followed up with Matt De La Rosa  She was seen for Break through bleeding  Bleeding has stopped but she has unbearable pressure and cramping, feels like she constantly has to urinate but no burning  She is wondering as well if her urine was tested at her visit with Matt De La Rosa  [address] Taina Zepeda 1:51 PM     TASK IN PROGRESS   Jessica Ramirez - 25 May 2017 1:53 PM     TASK EDITED  LMOM to cb    Jessica Ramirez - 25 May 2017 3:35 PM     TASK EDITED  Spoke with pt - issue started last night - woke up, thought had to pee - only a little came out  She has pelvic pressure, feels bloated and is having lower back pain  Does have a watery discharge - no bleeding  She was seen by Betsy Lambert last week due to break through bleeding, that has stopped  Patient is in a lot of discomfort  She has taken 3 Advil 200mg today - no help  She doesn't want to go to the hospital again  Wants an Rx, please advise  Thanks! ThomasRabia - 25 May 2017 5:34 PM     TASK REPLIED TO: Previously Assigned To Yasemin Armas  This is NOT a task that should be left in a provider bin at 3:35pm   I normally would not have seen it and it really shouldn't have waited overnight  That said, I called and left a message for the patient that if it gets back tonight to go to the ER    If not, she will call the office first thing tomorrow to coordinate the followin- Lab slip for U/A, C+S to be faxed to lab of her choice  2- ERx for Macrobid 100mg BID x 7 days to be sent to the pharmacy of her choice - please note, I will be at a  tomorrow so please have a midlevel sign off on this rather than leaving it in my box  3- Coordinate having her follow up on Tuesday of next week by phone with gyn nurse to ensure antibiotic selection was correct  Thank you! Romana Alonso - 26 May 2017 8:04 AM     TASK EDITED  spoke with pt    she went to the er at [facility] where they did labs and a ct scan    there was mild hematuria and culture sent    ct scan was wnl    rx given for Bactrim ds bid times 3 days qwhich she will fill this am  encouraged to force fluids    she will call me back tues and report    grateful for the early f/u call  fyi  to ed        Active Problems    1  Abnormal uterine bleeding (626 9) (N93 9)   2  Anxiety (300 00) (F41 9)   3  Cervical disc disease (722 91) (M50 90)   4  Dense breast tissue (793 82) (R92 2)   5  Dyspareunia (625 0)   6  Hypothyroidism (244 9) (E03 9)   7  Irregular menses (626 4) (N92 6)   8  Lump or mass in breast (611 72) (N63)   9  Paresthesia (782 0) (R20 2)   10  Postcoital bleeding (626 7) (N93 0)   11  Tingling in extremities (782 0) (R20 2)    Current Meds   1  Levothyroxine Sodium 100 MCG Oral Tablet; TAKE 1 TABLET DAILY; Therapy: (Recorded:14Jan2016) to Recorded   2  LORazepam 0 5 MG Oral Tablet; TAKE 1 TABLET EVERY 12 HOURS AS NEEDED; Therapy: 85ALG0824 to (Evaluate:01Jun2017); Last Rx:48Fro7742 Ordered   3  Vitamin D 2000 UNIT Oral Tablet; take 2 tab daily; Therapy: (Recorded:14Jan2016) to Recorded    Allergies    1  Advil TABS   2  Latex Exam Gloves MISC   3   Aleve TABS    Signatures   Electronically signed by : Petra Farley, ; May 26 2017  8:05AM EST                       (Author)

## 2018-01-14 VITALS
WEIGHT: 149 LBS | HEIGHT: 66 IN | BODY MASS INDEX: 23.95 KG/M2 | SYSTOLIC BLOOD PRESSURE: 102 MMHG | DIASTOLIC BLOOD PRESSURE: 62 MMHG

## 2018-01-14 NOTE — MISCELLANEOUS
Message  Patient called to cancel 1  her upcoming surgery with Dr Latrice Carrero  Patient states she went for a second opinion at [facility] and will be having surgery next week  1 Amended By: Huyen Wan; Oct 10 2017 8:19 AM EST    Active Problems   1  Anxiety (300 00) (F41 9)  2  Cervical disc disease (722 91) (M50 90)  3  Dense breast tissue (793 82) (R92 2)  4  Dyspareunia (625 0)  5  Hypothyroidism (244 9) (E03 9)  6  Irregular menses (626 4) (N92 6)  7  Malignant neoplasm of upper-outer quadrant of right breast in female, estrogen receptor   positive (174 4,V86 0) (C50 411,Z17 0)  8  Paresthesia (782 0) (R20 2)  9  Postcoital bleeding (626 7) (N93 0)  10  Tingling in extremities (782 0) (R20 2)    Current Meds  1  FLUoxetine HCl - 20 MG Oral Capsule; Therapy: (Recorded:17Fcp8028) to Recorded  2  Levothyroxine Sodium 100 MCG Oral Tablet; TAKE 1 TABLET DAILY; Therapy: (Recorded:27Umq3652) to Recorded  3  Vitamin D3 5000 UNIT Oral Capsule; Therapy: (Recorded:63Fdq9390) to Recorded    Allergies   1  Latex Exam Gloves MISC  2   Aleve TABS    Signatures   Electronically signed by : George Dumont, ; Oct 10 2017  8:19AM EST                       (Author)

## 2018-01-16 NOTE — MISCELLANEOUS
Message   Recorded as Task   Date: 05/15/2017 08:51 AM, Created By: Ivan Hickey   Task Name: Medical Complaint Callback   Assigned To: Coolspring Maynor   Regarding Patient: Natalie Chavez, Status: In Progress   Comment:    Sue Holland - 15 May 2017 8:51 AM     TASK CREATED  Caller: Self; Medical Complaint; [phone] (Home)  pt started bleeding a few days early; is not expecting her period  would like a call from us on Monday  (Pt lm w/answering service 5/13)   AshleyJessica - 15 May 2017 8:52 AM     TASK IN PROGRESS   Jessica Ramirez - 15 May 2017 8:56 AM     TASK EDITED  duplicate task  Active Problems    1  Abnormal uterine bleeding (626 9) (N93 9)   2  Cervical disc disease (722 91) (M50 90)   3  Dense breast tissue (793 82) (R92 2)   4  Dyspareunia (625 0)   5  Encounter for gynecological examination without abnormal finding (V72 31) (Z01 419)   6  Encounter for screening mammogram for malignant neoplasm of breast (V76 12)   (Z12 31)   7  Flu-like symptoms (780 99) (R68 89)   8  Hypothyroidism (244 9) (E03 9)   9  Lump or mass in breast (611 72) (N63)   10  Paresthesia (782 0) (R20 2)   11  Postcoital bleeding (626 7) (N93 0)   12  Tingling in extremities (782 0) (R20 2)   13  Urinary tract infection (599 0) (N39 0)    Current Meds   1  Levothyroxine Sodium 100 MCG Oral Tablet; TAKE 1 TABLET DAILY; Therapy: (Recorded:14Jan2016) to Recorded   2  Oseltamivir Phosphate 75 MG Oral Capsule (Tamiflu); TAKE 1 CAPSULE TWICE DAILY   WITH MEALS; Therapy: 29AAC9877 to (Evaluate:26Agn7471)  Requested for: 26CJP6418; Last   Rx:30Jan2017 Ordered   3  Vitamin D 2000 UNIT Oral Tablet; take 2 tab daily; Therapy: (Recorded:14Jan2016) to Recorded    Allergies    1  Advil TABS   2  Latex Exam Gloves MISC   3   Aleve TABS    Signatures   Electronically signed by : Jesus Manuel Bowie, ; May 15 2017  8:56AM EST                       (Author)

## 2018-01-16 NOTE — RESULT NOTES
Verified Results  (1) INFLUENZA A/B AND RSV, PCR [id number] 03:52PM Torres Fair Order Number: [id number]     Test Name Result Flag Reference   INFLUENZA A/MATRIX Detected A None Detected   INFLUENZA B None Detected  None Detected   RESP SYNCYTIAL VIRUS None Detected  None Detected       Discussion/Summary   Spoke with patient in regards to results  postive for influenza A  Reccommended patient continue Tamiflu  Patient states she has GI upset from medication and she has been taking it with foot  Discussed with patient she can stop Tamiflu  Discussed medicaiton will only help decreased symptoms by about 1 5 days  pateint verbalized understanding

## 2018-01-18 NOTE — MISCELLANEOUS
Message   Recorded as Task   Date: 09/11/2017 09:22 AM, Created By: Joy Hernandez   Task Name: Medical Complaint Callback   Assigned To: Raisa Pulido   Regarding Patient: Nelson Fermin, Status: In Progress   Comment:    Birdie Lema - 11 Sep 2017 9:22 AM     TASK CREATED  Caller: Self; Medical Complaint; [phone] (Home)  Pt saw Mercedez Graft 09/06 for lump in breast   Pt stated that D87 wanted to wait until after menses to see if lump was still there  Pt menses ended yesterday (09/10) and lump is unchanged  Pt is requesting order for US  Cristiano Polite - 11 Sep 2017 9:57 AM     TASK IN PROGRESS   Cristiano Polite - 11 Sep 2017 10:05 AM     TASK REPLIED TO: Previously Hugo Feil  Dr Mustapha Mitchell what do you want me to do with your pt office visit or send her for U/S then schedule after she is done? Brandon Veras - 12 Sep 2017 3:19 PM     TASK EDITED   Birdie Lema - 14 Sep 2017 8:27 AM     TASK EDITED  Pt called again to inquire about the US  Pt is very anxious and stated she would like an answer today if possible  If she doesn't hear from us she will call PCP  Rabia Garcia - 14 Sep 2017 10:03 AM     TASK REPLIED TO: Previously Assigned To Rabia Garcia  Please send patient for diagnostic breast US  Please check note for laterality  Thanks!! Jessica Ramirez - 14 Sep 2017 10:29 AM     TASK IN PROGRESS   Jessica Ramirez - 14 Sep 2017 10:36 AM     TASK EDITED  Spoke with pt - she is aware that Right breast u/s is available in [employer]  Gave her the number to central scheduling  She will call to make the appointment  Active Problems    1  Anxiety (300 00) (F41 9)   2  Axillary fullness (782 2) (R22 2)   3  Breast lump on right side at 11 o'clock position (611 72) (N63)   4  Cervical cancer screening (V76 2) (Z12 4)   5  Cervical disc disease (722 91) (M50 90)   6  Dense breast tissue (793 82) (R92 2)   7  Dyspareunia (625 0)   8   Encounter for general counseling and advice on contraceptive management (V25 09)   (Z30 09)   9  Encounter for gynecological examination without abnormal finding (V72 31) (Z01 419)   10  Hypothyroidism (244 9) (E03 9)   11  Irregular menses (626 4) (N92 6)   12  Mass of right breast (611 72) (N63)   13  Pap smear, as part of routine gynecological examination (V76 2) (Z01 419)   14  Paresthesia (782 0) (R20 2)   15  Postcoital bleeding (626 7) (N93 0)   16  Screening for HPV (human papillomavirus) (V73 81) (Z11 51)   17  Tingling in extremities (782 0) (R20 2)    Current Meds   1  FLUoxetine HCl - 20 MG Oral Capsule; Therapy: (Recorded:71Qgd5969) to Recorded   2  Levothyroxine Sodium 100 MCG Oral Tablet; TAKE 1 TABLET DAILY; Therapy: (Recorded:14Jan2016) to Recorded   3  Vitamin D3 5000 UNIT Oral Capsule; Therapy: (Recorded:31Cjf3886) to Recorded    Allergies    1  Latex Exam Gloves MISC   2  Aleve TABS    Plan  Breast lump on right side at 11 o'clock position    · *US BREAST RIGHT LIMITED (DIAGNOSTIC); Status:Hold For -  Scheduling,Retrospective By Protocol Authorization;  Requested [id number];     Signatures   Electronically signed by : Renae Ling, ; Sep 14 2017 10:36AM EST                       (Author)

## 2018-01-22 VITALS
BODY MASS INDEX: 22.98 KG/M2 | TEMPERATURE: 98.7 F | HEART RATE: 70 BPM | DIASTOLIC BLOOD PRESSURE: 72 MMHG | SYSTOLIC BLOOD PRESSURE: 110 MMHG | HEIGHT: 66 IN | OXYGEN SATURATION: 98 % | WEIGHT: 143 LBS | RESPIRATION RATE: 16 BRPM

## 2018-06-25 ENCOUNTER — TELEPHONE (OUTPATIENT)
Dept: OBGYN CLINIC | Facility: CLINIC | Age: 38
End: 2018-06-25

## 2018-10-30 ENCOUNTER — TELEPHONE (OUTPATIENT)
Dept: OBGYN CLINIC | Facility: CLINIC | Age: 38
End: 2018-10-30

## 2018-10-30 NOTE — TELEPHONE ENCOUNTER
Spoke with Pt today via phone call  Pt reports "pain" from vaginal dryness after hysterectomy, has tried several OTC lubricants to no avail  Pt informed that her last appointment with [facility] provider was 9/6/17  Pt advised that she should schedule an office visit with a provider as she reports having "pain" from vaginal dryness  Pt states she does not want to schedule an appointment at this time, will contact physician that performed her hysterectomy  Reiterated to Pt that if her symptoms worsen to contact office

## 2018-10-30 NOTE — TELEPHONE ENCOUNTER
Pt has had a hysterectomy and is in pain from vaginal dryness, is wondering if there is something she can use, she has tried some otc meds but they dont help

## 2018-10-30 NOTE — TELEPHONE ENCOUNTER
LMOM today @ [phone] for Pt tcb office regarding post-op hysterectomy issues (vaginal dryness)

## 2020-08-11 ENCOUNTER — OFFICE VISIT (OUTPATIENT)
Dept: URGENT CARE | Age: 40
End: 2020-08-11
Payer: COMMERCIAL

## 2020-08-11 VITALS
RESPIRATION RATE: 16 BRPM | TEMPERATURE: 97.1 F | HEIGHT: 66 IN | WEIGHT: 153 LBS | DIASTOLIC BLOOD PRESSURE: 56 MMHG | HEART RATE: 70 BPM | BODY MASS INDEX: 24.59 KG/M2 | OXYGEN SATURATION: 96 % | SYSTOLIC BLOOD PRESSURE: 116 MMHG

## 2020-08-11 DIAGNOSIS — F41.9 ANXIETY: Primary | ICD-10-CM

## 2020-08-11 LAB
ATRIAL RATE: 66 BPM
P AXIS: 45 DEGREES
PR INTERVAL: 172 MS
QRS AXIS: 28 DEGREES
QRSD INTERVAL: 72 MS
QT INTERVAL: 394 MS
QTC INTERVAL: 413 MS
T WAVE AXIS: 56 DEGREES
VENTRICULAR RATE: 66 BPM

## 2020-08-11 PROCEDURE — 99213 OFFICE O/P EST LOW 20 MIN: CPT | Performed by: PHYSICIAN ASSISTANT

## 2020-08-11 PROCEDURE — 93010 ELECTROCARDIOGRAM REPORT: CPT | Performed by: PHYSICIAN ASSISTANT

## 2020-08-11 PROCEDURE — 93005 ELECTROCARDIOGRAM TRACING: CPT | Performed by: PHYSICIAN ASSISTANT

## 2020-08-11 RX ORDER — DIPHENOXYLATE HYDROCHLORIDE AND ATROPINE SULFATE 2.5; .025 MG/1; MG/1
1 TABLET ORAL DAILY
COMMUNITY

## 2020-08-11 RX ORDER — ANASTROZOLE 1 MG/1
TABLET ORAL
COMMUNITY
Start: 2020-07-30

## 2020-08-11 NOTE — PROGRESS NOTES
[facility]      NAME: Juan Miguel Cutler is a 36 y o  female  : 1980    MRN: [id number]  DATE: 2020  TIME: 8:10 PM    Assessment and Plan   Anxiety [F41 9]  1  Anxiety       Twelve lead EKG performed in the office  Normal sinus rhythm  Septal infarct, age indeterminate  Official report pending    Patient Instructions     Discussed ED transfer patient at length  Patient ultimately agreed to transfer to ED for further evaluation of the atypical anxiety presentation       Chief Complaint     Chief Complaint   Patient presents with    Anxiety     pt states she has anxiety, has had breast cancer and today learned that a friend  of breast cancer  Pt has scans coming up which have made her very anxious  +SOB  +numbness of fingers  +CP  Pt states she had stopped taking Cymbalta a few months ago and her physician recently suggested that she start taking it again, but she has not started taking  Pt sates she would like some Ativan or something as a bridge to when she restarts Cymbalta  History of Present Illness       Patient is a 42-year-old female presenting the office for shortness of breath, chest pain, numbness tingling in her hands  Patient states that symptoms ongoing for the past 3 days in duration  Patient states she does have a prior history of anxiety and states that this symptomatology is similar to her presentation anxiety  Patient states that the symptoms are slightly stronger than usual  Patient denies any fevers any chills  Patient denies any problems with her eyes ears nose throat  Patient denies any nausea vomiting diarrhea  Patient has any muscle aches body aches  Patient denies any headache, neck pain, neck stiffness, dizziness, confusion  Patient states that she has not been taking any type of antidepressant medications  Patient is a breast cancer patient, recently diagnosed in 2017  Patient denies any DVT like symptoms    Patient offers no other complaints this time  Review of Systems   Review of Systems   Constitutional: Negative  HENT: Negative  Eyes: Negative  Respiratory: Positive for shortness of breath  Negative for apnea, cough, choking, chest tightness, wheezing and stridor  Cardiovascular: Positive for chest pain  Negative for palpitations and leg swelling  Gastrointestinal: Negative  Endocrine: Negative  Genitourinary: Negative  Musculoskeletal: Negative  Skin: Negative  Allergic/Immunologic: Negative  Neurological: Negative  Hematological: Negative  Psychiatric/Behavioral: Negative  Current Medications       Current Outpatient Medications:     anastrozole (ARIMIDEX) 1 mg tablet, , Disp: , Rfl:     cholecalciferol (VITAMIN D3) 1,000 units tablet, Take 1,000 Units by mouth daily, Disp: , Rfl:     levothyroxine 100 mcg tablet, Take 100 mcg by mouth daily, Disp: , Rfl:     multivitamin (THERAGRAN) TABS, Take 1 tablet by mouth daily, Disp: , Rfl:     FLUoxetine (PROzac) 20 mg capsule, Take 20 mg by mouth daily, Disp: , Rfl:     Current Allergies     Allergies as of 08/11/2020 - Reviewed 08/11/2020   Allergen Reaction Noted    Latex Itching 09/15/2017            The following portions of the patient's history were reviewed and updated as appropriate: allergies, current medications, past family history, past medical history, past social history, past surgical history and problem list      Past Medical History:   Diagnosis Date    Cancer ([address] )     breast    Hypothyroidism        Past Surgical History:   Procedure Laterality Date    US GUIDED BREAST BIOPSY RIGHT COMPLETE Right 9/18/2017       History reviewed  No pertinent family history  Medications have been verified          Objective   /56   Pulse 70   Temp (!) 97 1 °F (36 2 °C)   Resp 16   Ht 5' 6" (1 676 m)   Wt 69 4 kg (153 lb)   SpO2 96%   BMI 24 69 kg/m²        Physical Exam     Physical Exam  Vitals signs and nursing note reviewed  Constitutional:       Appearance: She is well-developed  HENT:      Head: Normocephalic  Right Ear: External ear normal       Left Ear: External ear normal    Eyes:      Conjunctiva/sclera: Conjunctivae normal       Pupils: Pupils are equal, round, and reactive to light  Neck:      Musculoskeletal: Normal range of motion  Cardiovascular:      Rate and Rhythm: Normal rate and regular rhythm  Pulses: Normal pulses  Heart sounds: Normal heart sounds  Pulmonary:      Effort: Pulmonary effort is normal  No respiratory distress  Breath sounds: Normal breath sounds  No stridor  No wheezing, rhonchi or rales  Chest:      Chest wall: No tenderness  Abdominal:      General: Abdomen is flat  Bowel sounds are normal    Skin:     General: Skin is warm  Capillary Refill: Capillary refill takes less than 2 seconds  Neurological:      Mental Status: She is alert and oriented to person, place, and time  Psychiatric:         Behavior: Behavior normal          Thought Content:  Thought content normal          Judgment: Judgment normal

## 2020-08-12 NOTE — PATIENT INSTRUCTIONS
Discussed ED transfer patient at length    Patient ultimately agreed to transfer to ED for further evaluation of the atypical anxiety presentation

## 2023-05-04 ENCOUNTER — TELEPHONE (OUTPATIENT)
Dept: PLASTIC SURGERY | Facility: CLINIC | Age: 43
End: 2023-05-04

## 2023-05-04 NOTE — TELEPHONE ENCOUNTER
Ginger Cerna,  Pt called wanting to reschedule her procedure from 5/3/23  She stated she would like to schedule for sometime in October  If you could please give her a call to schedule      Thank you

## 2023-10-16 ENCOUNTER — PROCEDURE VISIT (OUTPATIENT)
Dept: PLASTIC SURGERY | Facility: CLINIC | Age: 43
End: 2023-10-16
Payer: COMMERCIAL

## 2023-10-16 DIAGNOSIS — L98.9 LESION OF FACE: Primary | ICD-10-CM

## 2023-10-16 PROCEDURE — 11442 EXC FACE-MM B9+MARG 1.1-2 CM: CPT | Performed by: STUDENT IN AN ORGANIZED HEALTH CARE EDUCATION/TRAINING PROGRAM

## 2023-10-23 ENCOUNTER — OFFICE VISIT (OUTPATIENT)
Dept: PLASTIC SURGERY | Facility: CLINIC | Age: 43
End: 2023-10-23

## 2023-10-23 DIAGNOSIS — L98.9 SKIN LESIONS: Primary | ICD-10-CM

## 2023-10-23 PROCEDURE — 99024 POSTOP FOLLOW-UP VISIT: CPT | Performed by: PHYSICIAN ASSISTANT

## 2023-10-23 NOTE — PROGRESS NOTES
Assessment/Plan:     Pt is a 37 YOF who is s/p excision of skin lesions of the bilateral chin by Dr. Jack Sullivan on 10/16/23. Please see HPI. Pt returns to the office today for a first post operative visit and suture removal.  Patient has had no issues or concerns postoperatively. Pathology is pending. Patient will return to the office in apporx 1 week for an incision check or sooner with any questions or concerns. Diagnoses and all orders for this visit:    Skin lesions          Subjective:     Patient ID: Suzanne Avalos is a 37 y.o. female. HPI    Patient reports no issues or concerns. Review of Systems    See HPI    Objective:     Physical Exam      Incisions and sutures are clean, dry and intact.

## 2023-10-25 NOTE — PROGRESS NOTES
Skin excision    Date/Time: 10/16/2023 10:30 AM    Performed by: Jose Rafael Lucero DO  Authorized by: Jose Rafael Lucero DO  Universal Protocol:  Consent: Verbal consent obtained. Risks and benefits: risks, benefits and alternatives were discussed    Procedure Details - Skin Excision:     Number of Lesions:  2  Lesion 1:     Body area:  Head/neck    Head/neck location:  Lower lip    Malignancy: benign lesion            Final defect size (mm):  11    Repair type:  Linear closure     Repair Comments: After informed consent, the area of planned excision was marked. Local anesthetic was infiltrated and allowed time to set. The patient was prepped and draped in usual sterile fashion. The lesion was excised in elliptical fashion and removed in entirety. The peripheral skin was undermined to provide a tension free closure. Hemostasis was obtained. The defect was then closed in layers using 4-0 monocryl for the deep dermis. The skin was approximated using 6-0 prolene. The area was cleansed. Bacitracin and a bandaid were applied. The instrument, sponge and needle count were correct and verified upon completion of the case. The patient tolerated the procedure well.    Lesion 2:     Body area:  [facility]    Head/neck location:  Chin                                                        Malignancy: benign lesion        Final defect size (mm):  11    Repair comments: Same as above

## 2023-10-30 ENCOUNTER — OFFICE VISIT (OUTPATIENT)
Dept: URGENT CARE | Age: 43
End: 2023-10-30
Payer: COMMERCIAL

## 2023-10-30 ENCOUNTER — TELEPHONE (OUTPATIENT)
Age: 43
End: 2023-10-30

## 2023-10-30 VITALS
RESPIRATION RATE: 20 BRPM | DIASTOLIC BLOOD PRESSURE: 52 MMHG | BODY MASS INDEX: 24.57 KG/M2 | TEMPERATURE: 96.7 F | OXYGEN SATURATION: 98 % | WEIGHT: 152.2 LBS | HEART RATE: 78 BPM | SYSTOLIC BLOOD PRESSURE: 91 MMHG

## 2023-10-30 DIAGNOSIS — S61.451A DOG BITE OF RIGHT HAND, INITIAL ENCOUNTER: Primary | ICD-10-CM
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
## 2023-10-30 DIAGNOSIS — S61.452A DOG BITE OF LEFT HAND, INITIAL ENCOUNTER: ICD-10-CM
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
## 2023-10-30 DIAGNOSIS — Z23 ENCOUNTER FOR IMMUNIZATION: ICD-10-CM

## 2023-10-30 PROCEDURE — 90471 IMMUNIZATION ADMIN: CPT

## 2023-10-30 PROCEDURE — 90715 TDAP VACCINE 7 YRS/> IM: CPT

## 2023-10-30 PROCEDURE — 99213 OFFICE O/P EST LOW 20 MIN: CPT

## 2023-10-30 RX ORDER — PHYTONADIONE 5 MG/1
5 TABLET ORAL ONCE
COMMUNITY

## 2023-10-30 RX ORDER — DULOXETIN HYDROCHLORIDE 30 MG/1
30 CAPSULE, DELAYED RELEASE ORAL DAILY
COMMUNITY

## 2023-10-30 RX ORDER — FLUCONAZOLE 150 MG/1
150 TABLET ORAL ONCE
Qty: 1 TABLET | Refills: 0 | Status: SHIPPED | OUTPATIENT
Start: 2023-10-30 | End: 2023-10-30

## 2023-10-30 RX ORDER — AMOXICILLIN AND CLAVULANATE POTASSIUM 875; 125 MG/1; MG/1
1 TABLET, FILM COATED ORAL EVERY 12 HOURS SCHEDULED
Qty: 14 TABLET | Refills: 0 | Status: SHIPPED | OUTPATIENT
Start: 2023-10-30 | End: 2023-11-06

## 2023-10-30 NOTE — TELEPHONE ENCOUNTER
Pt called to cancel appt today with [facility]. She already been in twice and is healing fine. She feels she does not need to come in. Pt would appreciate a call back, has a few questions and inquiring about a pathology report.

## 2023-10-30 NOTE — PROGRESS NOTES
[facility]        NAME: Eddie Chaudhari is a 37 y.o. female  : 1980    MRN: [id number]  DATE: 2023  TIME: 2:06 PM    Assessment and Plan   Dog bite of right hand, initial encounter [S61.451A, W54. 0XXA]  1. Dog bite of right hand, initial encounter  Tdap Vaccine greater than or equal to 6yo    amoxicillin-clavulanate (AUGMENTIN) 875-125 mg per tablet    fluconazole (DIFLUCAN) 150 mg tablet      2. Dog bite of left hand, initial encounter  Tdap Vaccine greater than or equal to 6yo    amoxicillin-clavulanate (AUGMENTIN) 875-125 mg per tablet    fluconazole (DIFLUCAN) 150 mg tablet      3. Encounter for immunization  Tdap Vaccine greater than or equal to 6yo            Patient Instructions     Start Augmentin as prescribed  Keep area clean and dry  Watch for signs of further infection  Follow up with PCP in 3-5 days. Proceed to  ER if symptoms worsen. Eat yogurt with live and active cultures and/or take a probiotic at least 3 hours before or after antibiotic dose. Monitor stool for diarrhea and/or blood. If this occurs, contact primary care doctor ASAP. Chief Complaint     Chief Complaint   Patient presents with    Animal Bite     Pt was bit multiple times by family pet on Friday ([address]), while attempting to remove a clamp from paw. Pt has puncture wounds on fingers. Pt cleansed with soap, states is improving. Pt's last Tdap over 10 years ago. History of Present Illness       Patient is a pleasant 60-year-old female presenting in the clinic today for dog bite x3 days. Patient notes her own small Sammi terrier dog was injured on Friday. Patient notes the dog had a clamp on his cough and patient tried to remove the clamp from the dog's paw. Patient notes the dog then became upset and caused multiple bite wounds to bilateral hands. Admits swelling along left thumb.   Denies fever, chills, numbness, tingling, purulent drainage, bruising, erythema, warmth, chest pain, and SOB. Admits the use of soap water for wound cleansing. Patient notes her last Tdap was 10+ years ago. Review of Systems   Review of Systems   Constitutional:  Negative for chills and fever. Respiratory:  Negative for shortness of breath. Cardiovascular:  Negative for chest pain. Musculoskeletal:  Positive for joint swelling. Negative for arthralgias. Skin:  Positive for wound. Negative for rash. Neurological:  Negative for numbness.          Current Medications       Current Outpatient Medications:     amoxicillin-clavulanate (AUGMENTIN) 875-125 mg per tablet, Take 1 tablet by mouth every 12 (twelve) hours for 7 days, Disp: 14 tablet, Rfl: 0    anastrozole (ARIMIDEX) 1 mg tablet, , Disp: , Rfl:     cholecalciferol (VITAMIN D3) 1,000 units tablet, Take 1,000 Units by mouth daily, Disp: , Rfl:     DULoxetine (CYMBALTA) 30 mg delayed release capsule, Take 30 mg by mouth daily, Disp: , Rfl:     fluconazole (DIFLUCAN) 150 mg tablet, Take 1 tablet (150 mg total) by mouth once for 1 dose, Disp: 1 tablet, Rfl: 0    levothyroxine 100 mcg tablet, Take 100 mcg by mouth daily, Disp: , Rfl:     multivitamin (THERAGRAN) TABS, Take 1 tablet by mouth daily, Disp: , Rfl:     phytonadione (MEPHYTON) 5 mg tablet, Take 5 mg by mouth once, Disp: , Rfl:     FLUoxetine (PROzac) 20 mg capsule, Take 20 mg by mouth daily, Disp: , Rfl:     Current Allergies     Allergies as of 10/30/2023 - Reviewed 10/25/2023   Allergen Reaction Noted    Latex Itching 09/15/2017            The following portions of the patient's history were reviewed and updated as appropriate: allergies, current medications, past family history, past medical history, past social history, past surgical history and problem list.     Past Medical History:   Diagnosis Date    Cancer ([address])     breast    Hypothyroidism        Past Surgical History:   Procedure Laterality Date    US GUIDED BREAST BIOPSY RIGHT COMPLETE Right 9/18/2017       History reviewed. No pertinent family history. Medications have been verified. Objective   BP 91/52 (BP Location: Right arm, Patient Position: Sitting, Cuff Size: Standard)   Pulse 78   Temp (!) 96.7 °F (35.9 °C) (Tympanic)   Resp 20   Wt 69 kg (152 lb 3.2 oz)   SpO2 98%   BMI 24.57 kg/m²        Physical Exam     Physical Exam  Vitals reviewed. Constitutional:       General: She is not in acute distress. Appearance: Normal appearance. She is normal weight. She is not ill-appearing. HENT:      Head: Normocephalic. Nose: Nose normal.      Mouth/Throat:      Mouth: Mucous membranes are moist.   Eyes:      Conjunctiva/sclera: Conjunctivae normal.   Cardiovascular:      Rate and Rhythm: Normal rate and regular rhythm. Pulses: Normal pulses. Heart sounds: Normal heart sounds. No murmur heard. No friction rub. No gallop. Pulmonary:      Effort: Pulmonary effort is normal.      Breath sounds: Normal breath sounds. No wheezing, rhonchi or rales. Musculoskeletal:      Right forearm: Normal.      Left forearm: Normal.      Right wrist: Normal.      Left wrist: Normal.      Right hand: Tenderness present. No swelling or bony tenderness. Normal range of motion. Normal strength. Normal sensation. Normal capillary refill. Normal pulse. Left hand: Swelling (mild along distal aspect of left thumb) and tenderness present. No bony tenderness. Normal range of motion. Normal strength. Normal sensation. Normal capillary refill. Normal pulse. Cervical back: Normal range of motion and neck supple. Skin:     General: Skin is warm. Findings: Wound present. No rash. Comments: Multiple puncture wounds located along bilateral thumbs, bilateral index fingers, and bilateral 5th fingers. No drainage noted. Neurological:      Mental Status: She is alert.    Psychiatric:         Mood and Affect: Mood normal.         Behavior: Behavior normal.

## 2023-10-30 NOTE — PATIENT INSTRUCTIONS
Start Augmentin as prescribed  Keep area clean and dry  Watch for signs of further infection  Follow up with PCP in 3-5 days. Proceed to  ER if symptoms worsen. Eat yogurt with live and active cultures and/or take a probiotic at least 3 hours before or after antibiotic dose. Monitor stool for diarrhea and/or blood. If this occurs, contact primary care doctor ASAP.

## 2024-04-27 ENCOUNTER — OFFICE VISIT (OUTPATIENT)
Dept: URGENT CARE | Age: 44
End: 2024-04-27
Payer: COMMERCIAL

## 2024-04-27 VITALS
TEMPERATURE: 98 F | DIASTOLIC BLOOD PRESSURE: 66 MMHG | BODY MASS INDEX: 23.78 KG/M2 | HEART RATE: 66 BPM | HEIGHT: 66 IN | WEIGHT: 148 LBS | RESPIRATION RATE: 18 BRPM | SYSTOLIC BLOOD PRESSURE: 108 MMHG | OXYGEN SATURATION: 99 %

## 2024-04-27 DIAGNOSIS — J32.9 BACTERIAL SINUSITIS: ICD-10-CM

## 2024-04-27 DIAGNOSIS — B96.89 BACTERIAL SINUSITIS: ICD-10-CM

## 2024-04-27 DIAGNOSIS — J02.9 VIRAL PHARYNGITIS: ICD-10-CM

## 2024-04-27 DIAGNOSIS — J02.9 SORE THROAT: Primary | ICD-10-CM

## 2024-04-27 LAB — S PYO AG THROAT QL: NEGATIVE

## 2024-04-27 PROCEDURE — 87880 STREP A ASSAY W/OPTIC: CPT | Performed by: NURSE PRACTITIONER

## 2024-04-27 PROCEDURE — 99213 OFFICE O/P EST LOW 20 MIN: CPT | Performed by: NURSE PRACTITIONER

## 2024-04-27 PROCEDURE — 87070 CULTURE OTHR SPECIMN AEROBIC: CPT | Performed by: NURSE PRACTITIONER

## 2024-04-27 RX ORDER — AMOXICILLIN AND CLAVULANATE POTASSIUM 875; 125 MG/1; MG/1
1 TABLET, FILM COATED ORAL EVERY 12 HOURS SCHEDULED
Qty: 14 TABLET | Refills: 0 | Status: SHIPPED | OUTPATIENT
Start: 2024-04-27 | End: 2024-05-04

## 2024-04-27 NOTE — PROGRESS NOTES
"NAME: Kelley Bey is a 43 y.o. female  : 1980    MRN: [id number]    /66   Pulse 66   Temp 98 °F (36.7 °C)   Resp 18   Ht 5' 6\" (1.676 m)   Wt 67.1 kg (148 lb)   SpO2 99%   BMI 23.89 kg/m²     2:45 PM    Assessment and Plan   Sore throat [J02.9]  1. Sore throat  Throat culture    POCT rapid strepA      2. Viral pharyngitis        3. Bacterial sinusitis  amoxicillin-clavulanate (AUGMENTIN) 875-125 mg per tablet          Kelley was seen today for sore throat.    Diagnoses and all orders for this visit:    Sore throat  -     Throat culture  -     POCT rapid strepA    Viral pharyngitis    Bacterial sinusitis  -     amoxicillin-clavulanate (AUGMENTIN) 875-125 mg per tablet; Take 1 tablet by mouth every 12 (twelve) hours for 7 days        Patient Instructions     Patient Instructions   Take zyrtec or allegra daily  Use flonase 1-2 sprays in each nare daily   Use nasal saline to the nose,   Use humidifer in room  Symptoms worsen go to ER  Rest    Rapid strep was negative, sent for a culture  Tx augmentin for the sinus infection, aware not to use this and zpak together.     Increase fluids.     Proceed to the nearest ER if symptoms worsen, Follow up with your PCP  Continue to social distance, wash your hands, and wear your masks. Please continue to follow the CDC.gov guidelines daily for they are subject to change on COVID-19    Chief Complaint     Chief Complaint   Patient presents with    Sore Throat     Sore throat, nasal congestion, tongue pain, cough x10 days         History of Present Illness     43 yr old female here today for sore throat, for ten days, worst ever, OTC meds not helping, was dx with a viral infection when she saw her PCP a few weeks ago. Pt has worst pain today pain in the ears and down the sides of throat.             Review of Systems   Review of Systems   Constitutional:  Negative for activity change, appetite change, chills, fatigue and fever.   HENT:  Positive for " congestion, ear pain, postnasal drip, sinus pressure, sinus pain and sore throat. Negative for ear discharge, rhinorrhea and sneezing.    Eyes:  Negative for pain.   Respiratory:  Negative for cough, chest tightness, shortness of breath and wheezing.    Cardiovascular: Negative.    Gastrointestinal:  Negative for nausea.   Genitourinary: Negative.    Musculoskeletal: Negative.  Negative for neck pain.   Skin: Negative.    Allergic/Immunologic: Environmental allergies: Sinus.   Neurological:  Positive for headaches.   Psychiatric/Behavioral: Negative.           Current Medications       Current Outpatient Medications:     amoxicillin-clavulanate (AUGMENTIN) 875-125 mg per tablet, Take 1 tablet by mouth every 12 (twelve) hours for 7 days, Disp: 14 tablet, Rfl: 0    anastrozole (ARIMIDEX) 1 mg tablet, , Disp: , Rfl:     cholecalciferol (VITAMIN D3) 1,000 units tablet, Take 1,000 Units by mouth daily, Disp: , Rfl:     DULoxetine (CYMBALTA) 30 mg delayed release capsule, Take 30 mg by mouth daily, Disp: , Rfl:     levothyroxine 100 mcg tablet, Take 100 mcg by mouth daily, Disp: , Rfl:     multivitamin (THERAGRAN) TABS, Take 1 tablet by mouth daily, Disp: , Rfl:     FLUoxetine (PROzac) 20 mg capsule, Take 20 mg by mouth daily (Patient not taking: Reported on 4/27/2024), Disp: , Rfl:     phytonadione (MEPHYTON) 5 mg tablet, Take 5 mg by mouth once (Patient not taking: Reported on 4/27/2024), Disp: , Rfl:     Current Allergies     Allergies as of 04/27/2024 - Reviewed 04/27/2024   Allergen Reaction Noted    Latex Itching 09/15/2017              Past Medical History:   Diagnosis Date    Cancer ([facility])     breast    Hypothyroidism        Past Surgical History:   Procedure Laterality Date    US GUIDED BREAST BIOPSY RIGHT COMPLETE Right 9/18/2017       History reviewed. No pertinent family history.      Medications have been verified.    The following portions of the patient's history were reviewed and updated as appropriate:  "allergies, current medications, past family history, past medical history, past social history, past surgical history and problem list.    Objective   /66   Pulse 66   Temp 98 °F (36.7 °C)   Resp 18   Ht 5' 6\" (1.676 m)   Wt 67.1 kg (148 lb)   SpO2 99%   BMI 23.89 kg/m²      Physical Exam     Physical Exam  Constitutional:       General: She is awake. She is not in acute distress.     Appearance: Normal appearance. She is well-developed. She is not ill-appearing.   HENT:      Head: Normocephalic.      Right Ear: Hearing, tympanic membrane, ear canal and external ear normal. No drainage. There is no impacted cerumen. Tympanic membrane is not erythematous.      Left Ear: Hearing, tympanic membrane, ear canal and external ear normal. No drainage. There is no impacted cerumen. Tympanic membrane is not erythematous.      Ears:      Comments: Tms have dull to light reflex b/l     Nose: Rhinorrhea present. No mucosal edema or congestion.      Right Turbinates: Swollen and pale.      Left Turbinates: Swollen and pale.      Right Sinus: No maxillary sinus tenderness.      Left Sinus: No maxillary sinus tenderness.      Mouth/Throat:      Lips: Pink.      Mouth: Mucous membranes are moist.      Tongue: No lesions. Tongue does not deviate from midline.      Pharynx: Oropharynx is clear. Uvula midline. No pharyngeal swelling, oropharyngeal exudate, posterior oropharyngeal erythema or uvula swelling.      Tonsils: No tonsillar exudate or tonsillar abscesses.      Comments: Clear drainage in back of throat, no tonsils noted, no white exudate present, thin mucus present in back of throat,   Eyes:      General: Lids are normal.      Extraocular Movements: Extraocular movements intact.      Pupils: Pupils are equal, round, and reactive to light.   Cardiovascular:      Rate and Rhythm: Normal rate and regular rhythm.      Heart sounds: Normal heart sounds.   Pulmonary:      Effort: Pulmonary effort is normal.      Breath " "sounds: Normal breath sounds and air entry. No decreased breath sounds, wheezing, rhonchi or rales.   Skin:     General: Skin is warm.      Capillary Refill: Capillary refill takes less than 2 seconds.   Neurological:      General: No focal deficit present.      Mental Status: She is alert and oriented to person, place, and time.   Psychiatric:         Attention and Perception: Attention normal.         Mood and Affect: Mood normal.         Behavior: Behavior is cooperative.               Note: Portions of this record may have been created with voice recognition software. Occasional wrong word or \"sound a like\" substitutions may have occurred due to the inherent limitations of voice recognition software. Please read the chart carefully and recognize, using context, where substitutions have occurred.*    JONY Ibarra  "

## 2024-04-27 NOTE — PATIENT INSTRUCTIONS
Take zyrtec or allegra daily  Use flonase 1-2 sprays in each nare daily   Use nasal saline to the nose,   Use humidifer in room  Symptoms worsen go to ER  Rest    Rapid strep was negative, sent for a culture  Tx augmentin for the sinus infection, aware not to use this and zpak together.     Increase fluids.

## 2024-04-28 LAB — BACTERIA THROAT CULT: NORMAL

## 2024-04-29 LAB — BACTERIA THROAT CULT: NORMAL

## 2024-08-29 PROCEDURE — 87205 SMEAR GRAM STAIN: CPT | Performed by: PHYSICIAN ASSISTANT

## 2024-08-29 PROCEDURE — 87070 CULTURE OTHR SPECIMN AEROBIC: CPT | Performed by: PHYSICIAN ASSISTANT

## 2024-11-13 ENCOUNTER — CONSULT (OUTPATIENT)
Dept: PLASTIC SURGERY | Facility: CLINIC | Age: 44
End: 2024-11-13
Payer: COMMERCIAL

## 2024-11-13 VITALS — HEIGHT: 66 IN | WEIGHT: 150 LBS | BODY MASS INDEX: 24.11 KG/M2

## 2024-11-13 DIAGNOSIS — Z90.13 ACQUIRED ABSENCE OF BILATERAL BREASTS AND NIPPLES: Primary | ICD-10-CM

## 2024-11-13 PROCEDURE — 99214 OFFICE O/P EST MOD 30 MIN: CPT | Performed by: STUDENT IN AN ORGANIZED HEALTH CARE EDUCATION/TRAINING PROGRAM

## 2024-11-18 ENCOUNTER — PREP FOR PROCEDURE (OUTPATIENT)
Dept: PLASTIC SURGERY | Facility: CLINIC | Age: 44
End: 2024-11-18

## 2024-11-18 ENCOUNTER — TELEPHONE (OUTPATIENT)
Age: 44
End: 2024-11-18

## 2024-11-18 DIAGNOSIS — Z85.3 HISTORY OF BREAST CANCER: Primary | ICD-10-CM

## 2024-11-18 NOTE — TELEPHONE ENCOUNTER
Rec'd call from patient stating that she was told that ultrasound would be scheduled for her?    Ultrasound breast has been ordered. Patient was given Central Scheduling telephone number.    Patient may be going out of network due to prior imaging but is aware that if she does, disc and report must be supplied (for images to be uploaded).    Patient verbalized understanding.

## 2024-12-11 ENCOUNTER — HOSPITAL ENCOUNTER (OUTPATIENT)
Dept: ULTRASOUND IMAGING | Facility: CLINIC | Age: 44
Discharge: HOME | End: 2024-12-11
Payer: COMMERCIAL

## 2024-12-11 VITALS — HEIGHT: 66 IN | WEIGHT: 150 LBS | BODY MASS INDEX: 24.11 KG/M2

## 2024-12-11 DIAGNOSIS — Z90.13 ACQUIRED ABSENCE OF BILATERAL BREASTS AND NIPPLES: ICD-10-CM

## 2024-12-11 PROCEDURE — 76642 ULTRASOUND BREAST LIMITED: CPT

## 2025-01-08 ENCOUNTER — APPOINTMENT (OUTPATIENT)
Dept: LAB | Age: 45
End: 2025-01-08
Payer: COMMERCIAL

## 2025-01-08 DIAGNOSIS — Z85.3 HISTORY OF BREAST CANCER: ICD-10-CM

## 2025-01-08 LAB
ANION GAP SERPL CALCULATED.3IONS-SCNC: 11 MMOL/L (ref 4–13)
BASOPHILS # BLD AUTO: 0.04 THOUSANDS/ΜL (ref 0–0.1)
BASOPHILS NFR BLD AUTO: 1 % (ref 0–1)
BUN SERPL-MCNC: 24 MG/DL (ref 5–25)
CALCIUM SERPL-MCNC: 9.4 MG/DL (ref 8.4–10.2)
CHLORIDE SERPL-SCNC: 104 MMOL/L (ref 96–108)
CO2 SERPL-SCNC: 26 MMOL/L (ref 21–32)
CREAT SERPL-MCNC: 0.79 MG/DL (ref 0.6–1.3)
EOSINOPHIL # BLD AUTO: 0.07 THOUSAND/ΜL (ref 0–0.61)
EOSINOPHIL NFR BLD AUTO: 1 % (ref 0–6)
ERYTHROCYTE [DISTWIDTH] IN BLOOD BY AUTOMATED COUNT: 13.7 % (ref 11.6–15.1)
GFR SERPL CREATININE-BSD FRML MDRD: 91 ML/MIN/1.73SQ M
GLUCOSE P FAST SERPL-MCNC: 94 MG/DL (ref 65–99)
HCT VFR BLD AUTO: 40.2 % (ref 34.8–46.1)
HGB BLD-MCNC: 12.9 G/DL (ref 11.5–15.4)
IMM GRANULOCYTES # BLD AUTO: 0 THOUSAND/UL (ref 0–0.2)
IMM GRANULOCYTES NFR BLD AUTO: 0 % (ref 0–2)
LYMPHOCYTES # BLD AUTO: 1.5 THOUSANDS/ΜL (ref 0.6–4.47)
LYMPHOCYTES NFR BLD AUTO: 31 % (ref 14–44)
MCH RBC QN AUTO: 28.1 PG (ref 26.8–34.3)
MCHC RBC AUTO-ENTMCNC: 32.1 G/DL (ref 31.4–37.4)
MCV RBC AUTO: 88 FL (ref 82–98)
MONOCYTES # BLD AUTO: 0.52 THOUSAND/ΜL (ref 0.17–1.22)
MONOCYTES NFR BLD AUTO: 11 % (ref 4–12)
NEUTROPHILS # BLD AUTO: 2.78 THOUSANDS/ΜL (ref 1.85–7.62)
NEUTS SEG NFR BLD AUTO: 56 % (ref 43–75)
NRBC BLD AUTO-RTO: 0 /100 WBCS
PLATELET # BLD AUTO: 246 THOUSANDS/UL (ref 149–390)
PMV BLD AUTO: 10.4 FL (ref 8.9–12.7)
POTASSIUM SERPL-SCNC: 4.2 MMOL/L (ref 3.5–5.3)
RBC # BLD AUTO: 4.59 MILLION/UL (ref 3.81–5.12)
SODIUM SERPL-SCNC: 141 MMOL/L (ref 135–147)
WBC # BLD AUTO: 4.91 THOUSAND/UL (ref 4.31–10.16)

## 2025-01-08 PROCEDURE — 80048 BASIC METABOLIC PNL TOTAL CA: CPT

## 2025-01-08 PROCEDURE — 36415 COLL VENOUS BLD VENIPUNCTURE: CPT

## 2025-01-08 PROCEDURE — 85025 COMPLETE CBC W/AUTO DIFF WBC: CPT

## 2025-01-15 ENCOUNTER — TELEPHONE (OUTPATIENT)
Age: 45
End: 2025-01-15

## 2025-01-15 NOTE — TELEPHONE ENCOUNTER
Rec'd call from patient checking to see if we received her results for her pre-op blood work and if everything is okay?    Patient is scheduled for procedure with Dr. Lo on 1/30/2025.    Please return call to patient.

## 2025-01-16 NOTE — TELEPHONE ENCOUNTER
Received call from Patient with questions about her upcoming surgery 1/30/25 with Teresita. She had some questions she is hoping Teresita can answer.   Patient states she had an ultrasound done under armpit. She is wondering if Teresita is thinking to cut around there.     Chelsea AGUIRRE/Dr. Lo: Please call patient back to answer questions.

## 2025-01-17 ENCOUNTER — TELEPHONE (OUTPATIENT)
Dept: PLASTIC SURGERY | Facility: CLINIC | Age: 45
End: 2025-01-17

## 2025-01-17 DIAGNOSIS — Z90.13 ACQUIRED ABSENCE OF BILATERAL BREASTS AND NIPPLES: Primary | ICD-10-CM

## 2025-01-17 NOTE — TELEPHONE ENCOUNTER
Called patient.  Given US results, no reason to remove that axillary fullness unless it is bothersome to her.  Certainly it is reasonable to practice PT/OT to see if this has any benefit.  The skin excision is certainly reasonable but it not mandatory and is up to the wishes of the patient.    She expressed her appreciation and gratefulness.    Renu Lo, DO  Plastic and Reconstructive Surgery

## 2025-01-27 DIAGNOSIS — Z85.3 HISTORY OF BREAST CANCER: Primary | ICD-10-CM

## 2025-01-27 RX ORDER — GABAPENTIN 300 MG/1
300 CAPSULE ORAL 3 TIMES DAILY
Qty: 15 CAPSULE | Refills: 0 | Status: SHIPPED | OUTPATIENT
Start: 2025-01-27 | End: 2025-02-01

## 2025-01-27 RX ORDER — TRAMADOL HYDROCHLORIDE 50 MG/1
50 TABLET ORAL EVERY 6 HOURS PRN
Qty: 10 TABLET | Refills: 0 | Status: SHIPPED | OUTPATIENT
Start: 2025-01-27

## 2025-01-27 RX ORDER — IBUPROFEN 800 MG/1
800 TABLET, FILM COATED ORAL EVERY 8 HOURS SCHEDULED
Qty: 15 TABLET | Refills: 0 | Status: SHIPPED | OUTPATIENT
Start: 2025-01-27 | End: 2025-02-01

## 2025-01-27 RX ORDER — ACETAMINOPHEN 500 MG
500 TABLET ORAL EVERY 6 HOURS
Qty: 20 TABLET | Refills: 0 | Status: SHIPPED | OUTPATIENT
Start: 2025-01-27 | End: 2025-02-01

## 2025-01-27 RX ORDER — CEFAZOLIN SODIUM 2 G/50ML
2000 SOLUTION INTRAVENOUS ONCE
Status: CANCELLED | OUTPATIENT
Start: 2025-01-30 | End: 2025-01-27

## 2025-01-28 NOTE — PRE-PROCEDURE INSTRUCTIONS
Pre-Surgery Instructions:   Medication Instructions    acetaminophen (TYLENOL) 500 mg tablet Uses PRN- OK to take day of surgery    anastrozole (ARIMIDEX) 1 mg tablet Take night before surgery    cholecalciferol (VITAMIN D3) 1,000 units tablet Take night before surgery    co-enzyme Q-10 30 mg Stop taking 2 days prior to surgery.    DULoxetine (CYMBALTA) 30 mg delayed release capsule Take night before surgery    levothyroxine 100 mcg tablet Take day of surgery.    multivitamin (THERAGRAN) TABS Stop taking 2 days prior to surgery.    phytonadione (MEPHYTON) 5 mg tablet Stop taking 2 days prior to surgery. Not prescribed.   Medication instructions for day surgery reviewed. Please use only a sip of water to take your instructed medications. Avoid all over the counter vitamins, supplements and NSAIDS for one week prior to surgery per anesthesia guidelines. Tylenol is ok to take as needed.     You will receive a call one business day prior to surgery with an arrival time and hospital directions. If your surgery is scheduled on a Monday, the hospital will be calling you on the Friday prior to your surgery. If you have not heard from anyone by 8pm, please call the hospital supervisor through the hospital  at [phone]. ([facility] [phone] or [facility] [phone]).    Do not eat or drink anything after midnight the night before your surgery, including candy, mints, lifesavers, or chewing gum. Do not drink alcohol 24hrs before your surgery. Try not to smoke at least 24hrs before your surgery.       Follow the pre surgery showering instructions as listed in the “My Surgical Experience Booklet” or otherwise provided by your surgeon's office. Do not use a blade to shave the surgical area 1 week before surgery. It is okay to use a clean electric clippers up to 24 hours before surgery. Do not apply any lotions, creams, including makeup, cologne, deodorant, or perfumes after showering on the day of your surgery. Do  not use dry shampoo, hair spray, hair gel, or any type of hair products.     No contact lenses, eye make-up, or artificial eyelashes. Remove nail polish, including gel polish, and any artificial, gel, or acrylic nails if possible. Remove all jewelry including rings and body piercing jewelry.     Wear causal clothing that is easy to take on and off. Consider your type of surgery.    Keep any valuables, jewelry, piercings at home. Please bring any specially ordered equipment (sling, braces) if indicated.    Arrange for a responsible person to drive you to and from the hospital on the day of your surgery. Please confirm the visitor policy for the day of your procedure when you receive your phone call with an arrival time.     Call the surgeon's office with any new illnesses, exposures, or additional questions prior to surgery.    Please reference your “My Surgical Experience Booklet” for additional information to prepare for your upcoming surgery.

## 2025-01-29 ENCOUNTER — ANESTHESIA EVENT (OUTPATIENT)
Dept: PERIOP | Facility: HOSPITAL | Age: 45
End: 2025-01-29
Payer: COMMERCIAL

## 2025-01-30 ENCOUNTER — HOSPITAL ENCOUNTER (OUTPATIENT)
Facility: HOSPITAL | Age: 45
Setting detail: OUTPATIENT SURGERY
Discharge: HOME | End: 2025-01-30
Attending: STUDENT IN AN ORGANIZED HEALTH CARE EDUCATION/TRAINING PROGRAM | Admitting: STUDENT IN AN ORGANIZED HEALTH CARE EDUCATION/TRAINING PROGRAM
Payer: COMMERCIAL

## 2025-01-30 ENCOUNTER — ANESTHESIA (OUTPATIENT)
Dept: PERIOP | Facility: HOSPITAL | Age: 45
End: 2025-01-30
Payer: COMMERCIAL

## 2025-01-30 VITALS
SYSTOLIC BLOOD PRESSURE: 113 MMHG | HEART RATE: 65 BPM | BODY MASS INDEX: 24.11 KG/M2 | DIASTOLIC BLOOD PRESSURE: 63 MMHG | TEMPERATURE: 97.4 F | WEIGHT: 150 LBS | HEIGHT: 66 IN | OXYGEN SATURATION: 100 % | RESPIRATION RATE: 18 BRPM

## 2025-01-30 DIAGNOSIS — Z85.3 HISTORY OF BREAST CANCER: ICD-10-CM

## 2025-01-30 PROBLEM — C50.919 BREAST CANCER (HCC): Status: ACTIVE | Noted: 2025-01-30

## 2025-01-30 PROBLEM — G89.29 CHRONIC PAIN: Status: ACTIVE | Noted: 2025-01-30

## 2025-01-30 PROBLEM — E03.9 HYPOTHYROIDISM: Status: ACTIVE | Noted: 2025-01-30

## 2025-01-30 PROCEDURE — 13132 CMPLX RPR F/C/C/M/N/AX/G/H/F: CPT

## 2025-01-30 PROCEDURE — 11406 EXC TR-EXT B9+MARG >4.0 CM: CPT | Performed by: STUDENT IN AN ORGANIZED HEALTH CARE EDUCATION/TRAINING PROGRAM

## 2025-01-30 PROCEDURE — 13133 CMPLX RPR F/C/C/M/N/AX/G/H/F: CPT | Performed by: STUDENT IN AN ORGANIZED HEALTH CARE EDUCATION/TRAINING PROGRAM

## 2025-01-30 PROCEDURE — 11406 EXC TR-EXT B9+MARG >4.0 CM: CPT

## 2025-01-30 PROCEDURE — 13133 CMPLX RPR F/C/C/M/N/AX/G/H/F: CPT

## 2025-01-30 PROCEDURE — NC001 PR NO CHARGE: Performed by: STUDENT IN AN ORGANIZED HEALTH CARE EDUCATION/TRAINING PROGRAM

## 2025-01-30 PROCEDURE — 88305 TISSUE EXAM BY PATHOLOGIST: CPT | Performed by: PATHOLOGY

## 2025-01-30 PROCEDURE — 13132 CMPLX RPR F/C/C/M/N/AX/G/H/F: CPT | Performed by: STUDENT IN AN ORGANIZED HEALTH CARE EDUCATION/TRAINING PROGRAM

## 2025-01-30 RX ORDER — PROPOFOL 10 MG/ML
INJECTION, EMULSION INTRAVENOUS CONTINUOUS PRN
Status: DISCONTINUED | OUTPATIENT
Start: 2025-01-30 | End: 2025-01-30

## 2025-01-30 RX ORDER — DEXAMETHASONE SODIUM PHOSPHATE 10 MG/ML
INJECTION, SOLUTION INTRAMUSCULAR; INTRAVENOUS AS NEEDED
Status: DISCONTINUED | OUTPATIENT
Start: 2025-01-30 | End: 2025-01-30

## 2025-01-30 RX ORDER — OXYCODONE HYDROCHLORIDE 5 MG/1
5 TABLET ORAL ONCE AS NEEDED
Status: DISCONTINUED | OUTPATIENT
Start: 2025-01-30 | End: 2025-01-30 | Stop reason: HOSPADM

## 2025-01-30 RX ORDER — MIDAZOLAM HYDROCHLORIDE 2 MG/2ML
INJECTION, SOLUTION INTRAMUSCULAR; INTRAVENOUS AS NEEDED
Status: DISCONTINUED | OUTPATIENT
Start: 2025-01-30 | End: 2025-01-30

## 2025-01-30 RX ORDER — TRAMADOL HYDROCHLORIDE 50 MG/1
50 TABLET ORAL ONCE AS NEEDED
Status: COMPLETED | OUTPATIENT
Start: 2025-01-30 | End: 2025-01-30

## 2025-01-30 RX ORDER — MAGNESIUM HYDROXIDE 1200 MG/15ML
LIQUID ORAL AS NEEDED
Status: DISCONTINUED | OUTPATIENT
Start: 2025-01-30 | End: 2025-01-30 | Stop reason: HOSPADM

## 2025-01-30 RX ORDER — LEVOTHYROXINE SODIUM 75 MCG
TABLET ORAL
COMMUNITY
Start: 2025-01-13

## 2025-01-30 RX ORDER — FENTANYL CITRATE/PF 50 MCG/ML
50 SYRINGE (ML) INJECTION
Status: COMPLETED | OUTPATIENT
Start: 2025-01-30 | End: 2025-01-30

## 2025-01-30 RX ORDER — ASCORBIC ACID 125 MG
TABLET,CHEWABLE ORAL
COMMUNITY

## 2025-01-30 RX ORDER — CEFAZOLIN SODIUM 2 G/50ML
2000 SOLUTION INTRAVENOUS ONCE
Status: COMPLETED | OUTPATIENT
Start: 2025-01-30 | End: 2025-01-30

## 2025-01-30 RX ORDER — HYDROMORPHONE HCL/PF 1 MG/ML
0.5 SYRINGE (ML) INJECTION
Status: DISCONTINUED | OUTPATIENT
Start: 2025-01-30 | End: 2025-01-30 | Stop reason: HOSPADM

## 2025-01-30 RX ORDER — ONDANSETRON 4 MG/1
4 TABLET, ORALLY DISINTEGRATING ORAL ONCE AS NEEDED
Status: DISCONTINUED | OUTPATIENT
Start: 2025-01-30 | End: 2025-01-30 | Stop reason: HOSPADM

## 2025-01-30 RX ORDER — SODIUM CHLORIDE, SODIUM LACTATE, POTASSIUM CHLORIDE, CALCIUM CHLORIDE 600; 310; 30; 20 MG/100ML; MG/100ML; MG/100ML; MG/100ML
50 INJECTION, SOLUTION INTRAVENOUS CONTINUOUS
Status: DISCONTINUED | OUTPATIENT
Start: 2025-01-30 | End: 2025-01-30 | Stop reason: HOSPADM

## 2025-01-30 RX ORDER — FENTANYL CITRATE 50 UG/ML
INJECTION, SOLUTION INTRAMUSCULAR; INTRAVENOUS AS NEEDED
Status: DISCONTINUED | OUTPATIENT
Start: 2025-01-30 | End: 2025-01-30

## 2025-01-30 RX ORDER — PROPOFOL 10 MG/ML
INJECTION, EMULSION INTRAVENOUS AS NEEDED
Status: DISCONTINUED | OUTPATIENT
Start: 2025-01-30 | End: 2025-01-30

## 2025-01-30 RX ORDER — LIDOCAINE HYDROCHLORIDE 10 MG/ML
INJECTION, SOLUTION EPIDURAL; INFILTRATION; INTRACAUDAL; PERINEURAL AS NEEDED
Status: DISCONTINUED | OUTPATIENT
Start: 2025-01-30 | End: 2025-01-30

## 2025-01-30 RX ORDER — LIDOCAINE HYDROCHLORIDE AND EPINEPHRINE 10; 10 MG/ML; UG/ML
INJECTION, SOLUTION INFILTRATION; PERINEURAL AS NEEDED
Status: DISCONTINUED | OUTPATIENT
Start: 2025-01-30 | End: 2025-01-30 | Stop reason: HOSPADM

## 2025-01-30 RX ORDER — ONDANSETRON 2 MG/ML
INJECTION INTRAMUSCULAR; INTRAVENOUS AS NEEDED
Status: DISCONTINUED | OUTPATIENT
Start: 2025-01-30 | End: 2025-01-30

## 2025-01-30 RX ADMIN — FENTANYL CITRATE 50 MCG: 50 INJECTION, SOLUTION INTRAMUSCULAR; INTRAVENOUS at 10:14

## 2025-01-30 RX ADMIN — LIDOCAINE HYDROCHLORIDE 50 MG: 10 SOLUTION INTRAVENOUS at 10:15

## 2025-01-30 RX ADMIN — PROPOFOL 200 MG: 10 INJECTION, EMULSION INTRAVENOUS at 10:15

## 2025-01-30 RX ADMIN — FENTANYL CITRATE 50 MCG: 50 INJECTION, SOLUTION INTRAMUSCULAR; INTRAVENOUS at 11:48

## 2025-01-30 RX ADMIN — DEXAMETHASONE SODIUM PHOSPHATE 10 MG: 10 INJECTION, SOLUTION INTRAMUSCULAR; INTRAVENOUS at 10:19

## 2025-01-30 RX ADMIN — SODIUM CHLORIDE, SODIUM LACTATE, POTASSIUM CHLORIDE, AND CALCIUM CHLORIDE: .6; .31; .03; .02 INJECTION, SOLUTION INTRAVENOUS at 10:02

## 2025-01-30 RX ADMIN — TRAMADOL HYDROCHLORIDE 50 MG: 50 TABLET, COATED ORAL at 12:42

## 2025-01-30 RX ADMIN — PROPOFOL 120 MCG/KG/MIN: 10 INJECTION, EMULSION INTRAVENOUS at 10:15

## 2025-01-30 RX ADMIN — ONDANSETRON 4 MG: 2 INJECTION INTRAMUSCULAR; INTRAVENOUS at 10:51

## 2025-01-30 RX ADMIN — MIDAZOLAM 2 MG: 1 INJECTION INTRAMUSCULAR; INTRAVENOUS at 10:12

## 2025-01-30 RX ADMIN — CEFAZOLIN SODIUM 2000 MG: 2 SOLUTION INTRAVENOUS at 10:09

## 2025-01-30 RX ADMIN — FENTANYL CITRATE 50 MCG: 50 INJECTION, SOLUTION INTRAMUSCULAR; INTRAVENOUS at 11:37

## 2025-01-30 RX ADMIN — FENTANYL CITRATE 50 MCG: 50 INJECTION, SOLUTION INTRAMUSCULAR; INTRAVENOUS at 10:47

## 2025-01-30 NOTE — ANESTHESIA POSTPROCEDURE EVALUATION
Post-Op Assessment Note    CV Status:  Stable  Pain Score: 2    Pain management: adequate       Mental Status:  Alert and awake   Hydration Status:  Euvolemic   PONV Controlled:  Controlled   Airway Patency:  Patent     Post Op Vitals Reviewed: Yes    No anethesia notable event occurred.    Staff: Anesthesiologist           Last Filed PACU Vitals:  Vitals Value Taken Time   Temp 97.9 °F (36.6 °C) 01/30/25 1117   Pulse 72 01/30/25 1215   /68 01/30/25 1215   Resp 16 01/30/25 1215   SpO2 96 % 01/30/25 1215       Modified Josseline:     Vitals Value Taken Time   Activity 2 01/30/25 1215   Respiration 2 01/30/25 1215   Circulation 2 01/30/25 1215   Consciousness 2 01/30/25 1215   Oxygen Saturation 2 01/30/25 1215     Modified Josseline Score: 10

## 2025-01-30 NOTE — INTERVAL H&P NOTE
H&P reviewed. After examining the patient I find no changes in the patients condition since the H&P had been written.    Vitals:    01/30/25 0851   BP: 97/64   Pulse: 63   Resp: 18   Temp: (!) 97 °F (36.1 °C)   SpO2: 100%

## 2025-01-30 NOTE — ANESTHESIA POSTPROCEDURE EVALUATION
Post-Op Assessment Note    CV Status:  Stable    Pain management: adequate       Mental Status:  Sleepy and arousable   Hydration Status:  Euvolemic   PONV Controlled:  Controlled   Airway Patency:  Patent     Post Op Vitals Reviewed: Yes    No anethesia notable event occurred.    Staff: CRNA           Last Filed PACU Vitals:  Vitals Value Taken Time   Temp 97.9    Pulse 89 01/30/25 1120   /59    Resp 13    SpO2 100 % 01/30/25 1120   Vitals shown include unfiled device data.

## 2025-01-30 NOTE — DISCHARGE INSTR - AVS FIRST PAGE
Surgery Date: 1/30/2025                Patient: Kelley Bey  Surgeon:  Dr. Renu Lo    Postoperative Instructions for Outpatient Surgery  Excision of Axillary Excess     Dressings:  [x] Skin glue was applied to your incision over absorbable sutures.  You may feel small pieces of suture at the ends of your incision.    [x] Keep dressings in place until you shower.  [x] Other instructions: Once dressings are off be sure to wear a compressive sports bra or an ACE wrap.     Bathing:  [x] Shower 24-48 hours after surgery.  Allow soap and water to gently wash over the incision.  No scrubbing.  Gently pat dry and apply dressing as needed/instructed above.  [x] No submerging incision in bathtub, pool, hot tub and/or lake.     Activity:  [x] No heavy lifting (> 10lbs).  [x] No strenuous exercise.  [x] Walking is permitted and encouraged.  [x] Strict sun avoidance/protection of incision site.  [] Other instructions:      Medication:  [x] Resume preoperative medications.  [x] Ok to use Tylenol for pain control.  You may also use ibuprofen 24 hours after surgery.  [x] You may not drive until off your pain medications.  [x] Apply ice to area as needed for pain.  Do not place ice directly on skin.  [] Other instructions:      It is expected to have some bruising, swelling and mild oozing at the incision site and the surrounding area.  If there is more than you expect or you suspect an infection, please call the office.     Some patients may experience a low-grade fever after surgery.  If it is above 100.4, please call the office.     If you do not have a postoperative office appointment scheduled, please call the office today and let the staff know Dr. Lo needs to see you in 7-14  days.    Please call the office with any questions, concerns or changes.

## 2025-01-30 NOTE — H&P
Assessment and Plan:  Ms. Bey is a 44 y.o. female presenting to discuss options for revision of aesthetic flat closure     We discussed the procedure, risks, benefits, alternatives and postoperative instructions and expectations.  Informed consent obtained.  Will only address bilateral axillary excess as her prominent sternum will not really change the appearance centrally.  Laterally, she has discomfort from her excess skin particularly on the left.  I do think this is reasonable to address.  I will not plan to explore or remove tissue from the superior apex given the risk vs reward and US findings.  She will begin lymphedema therapy postoperatively.     History of Present Illness:   Ms. Bey is a 44 y.o. female presenting s/p mastectomy without formal reconstruction.  She is generally happy with her flat closure but notes worsening irritation laterally and some discomfort within her left axilla.  She underwent XRT on the right side.  She wears an external prosthetic but feels comfortable without it as well.   She denies nicotine use.        Review of Systems:  A 12 point ROS was performed and negative except per HPI.     Past Medical History:  Medical History        Past Medical History:   Diagnosis Date    Cancer (HCC)       breast    Hypothyroidism              Past Surgical History:  Surgical History         Past Surgical History:   Procedure Laterality Date    US GUIDED BREAST BIOPSY RIGHT COMPLETE Right 9/18/2017            Social History:  Social History   Social History           Tobacco Use    Smoking status: Never    Smokeless tobacco: Never   Vaping Use    Vaping status: Never Used   Substance Use Topics    Alcohol use: Yes    Drug use: No            Family History:  Family History   History reviewed. No pertinent family history.        Allergies:  Allergies        Allergies   Allergen Reactions    Latex Itching            Medications:  Medications Ordered Prior to Encounter          Current  "Outpatient Medications on File Prior to Visit   Medication Sig Dispense Refill    anastrozole (ARIMIDEX) 1 mg tablet          cholecalciferol (VITAMIN D3) 1,000 units tablet Take 1,000 Units by mouth daily        DULoxetine (CYMBALTA) 30 mg delayed release capsule Take 30 mg by mouth daily        levothyroxine 100 mcg tablet Take 100 mcg by mouth daily        multivitamin (THERAGRAN) TABS Take 1 tablet by mouth daily        phytonadione (MEPHYTON) 5 mg tablet Take 5 mg by mouth once        FLUoxetine (PROzac) 20 mg capsule Take 20 mg by mouth daily (Patient not taking: Reported on 4/27/2024)          No current facility-administered medications on file prior to visit.               Physical Examination:  Ht 5' 6\" (1.676 m)   Wt 68 kg (150 lb)   BMI 24.21 kg/m²   Estimated body mass index is 24.21 kg/m² as calculated from the following:    Height as of this encounter: 5' 6\" (1.676 m).    Weight as of this encounter: 68 kg (150 lb).  General: NAD, well appearing, AAOx3  HEENT: NCAT, EOMI, MMM, supple  Resp: Nonlabored  Heart: RRR  Abdomen: Soft, ND, NT  Extremities/MSK: no LE edema, no obvious deficits in ROM  Neuro: grossly intact with no obvious deficits  Skin: no obvious lesions or rashes  Breast: no palpable mass, no palpable axillary lymphadenopathy but generalized fullness of left axilla c/w right which is more prominent when patient extends her arms overhead, well healed incisions with excess skin laterally and medially        Renu Lo, DO  Plastic and Reconstructive Surgery  "

## 2025-01-30 NOTE — ANESTHESIA PREPROCEDURE EVALUATION
"Procedure:  EXCISION OF BILATERAL AXILLARY EXCESS (Bilateral: Abdomen)    Relevant Problems   ENDO   (+) Hypothyroidism      GYN   (+) Breast cancer (HCC)      NEURO/PSYCH   (+) Chronic pain        Physical Exam    Airway    Mallampati score: II  TM Distance: >3 FB  Neck ROM: full     Dental   No notable dental hx     Cardiovascular  Cardiovascular exam normal    Pulmonary  Pulmonary exam normal     Other Findings  post-pubertal.      Anesthesia Plan  ASA Score- 2     Anesthesia Type- general with ASA Monitors.         Additional Monitors:     Airway Plan:            Plan Factors-Exercise tolerance (METS): >4 METS.    Chart reviewed. EKG reviewed.  Existing labs reviewed. Patient summary reviewed.    Patient is not a current smoker. Patient not instructed to abstain from smoking on day of procedure. Patient did not smoke on day of surgery.            Induction- intravenous.    Postoperative Plan- Plan for postoperative opioid use.     Perioperative Resuscitation Plan - Level 1 - Full Code.       Informed Consent- Anesthetic plan and risks discussed with patient.  I personally reviewed this patient with the CRNA. Discussed and agreed on the Anesthesia Plan with the CRNA..      NPO Status:  No vitals data found for the desired time range.      No results found for: \"HGBA1C\"    Lab Results   Component Value Date    K 4.2 01/08/2025     01/08/2025    CO2 26 01/08/2025    BUN 24 01/08/2025    CREATININE 0.79 01/08/2025    GLUCOSE 97 05/13/2017    GLUF 94 01/08/2025    CALCIUM 9.4 01/08/2025    AST 22 11/07/2024    ALT 19 11/07/2024    ALKPHOS 63 11/07/2024    EGFR 91 01/08/2025       Lab Results   Component Value Date    WBC 4.91 01/08/2025    HGB 12.9 01/08/2025    HCT 40.2 01/08/2025    MCV 88 01/08/2025     01/08/2025       "

## 2025-01-30 NOTE — OP NOTE
OPERATIVE REPORT  PATIENT NAME: Kelley Bey    :  1980  MRN: [id number]  Pt Location:  OR ROOM 12    SURGERY DATE: 2025    Surgeons and Role:     * Renu Lo,  - Primary     * Miladys Orozco PA-C - Assisting    Preop Diagnosis:  History of breast cancer [Z85.3]    Post-Op Diagnosis Codes:     * History of breast cancer [Z85.3]    Procedure(s):  Bilateral - EXCISION OF BILATERAL AXILLARY EXCESS    Specimen(s):  ID Type Source Tests Collected by Time Destination   1 : right axillary excess Tissue Axillary TISSUE EXAM Renu Jacque Lo, DO 2025 1029    2 : left axillary excess Tissue Axillary TISSUE EXAM Renu Jacque Teresita, DO 2025 1030    3 : left capsule Tissue Soft Tissue, Other TISSUE EXAM Renu Jacque Lo, DO 2025 1055        Estimated Blood Loss:   5 ml    Drains:  * No LDAs found *    Anesthesia Type:   General/TIVA    Operative Indications:  43 yo female with h/o remote breast cancer and aesthetic flat closure presented to me with standing cone deformities associated with discomfort.    Operative Findings:  Excision of right axillary excess 8x5 cm with closure length 10 cm  Excision of left axillary excess 11x6 cm with closure length 13 cm  Left capsulectomy of seroma cavity which was discovered upon dissection/undermining      Complications:   None    Procedure and Technique:    The patient was seen preoperatively.  The procedure, risks, benefits and alternatives were discussed.  Informed consent was obtained.  The patient was site marked preoperatively.  The patient was brought to the operating room where she was positioned supine with all of her pressure points appropriately padded.  Anesthesia commenced.  A timeout was performed and verified.    The patient was prepped and draped in usual sterile fashion.  The area was infiltrated with local anesthesia.  An incision was made sharply surrounding the marked area on the right.  It was dissected  from surrounding tissue.  The tissue was sent for pathology.  The wound was irrigated copiously and hemostasis was obtained.  The surrounding tissue was undermined extensively at least the maximum width of the defect measured perpendicular to the closure line along at least one entire edge of the defect to allow for a tension free closure. The remaining wound was closed in layers, using 3-0 monocryl for the fascia and deep dermis and 4-0 PDS to approximate the skin.  The same procedure was performed on the left except that a small seroma cavity was discovered during dissection and thus a capsulectomy was performed in an effort to prevent this from recurrence.  The areas were cleansed and glue was applied.  Once dry, steristrips were placed followed by a compression dressing.    The instrument, sponge and needle count was correct an verified prior to completion of the case.    The patient emerged from anesthesia and was transferred to the recovery room in stable condition.      Patient Disposition:  PACU         SIGNATURE: Renusasha Fischerjigna Lo DO  DATE: January 30, 2025  TIME: 11:32 AM      No qualified plastic surgery resident was available for the case.  The MAKEN[facility]IE provided assistance with retraction and suturing.

## 2025-02-03 PROCEDURE — 88305 TISSUE EXAM BY PATHOLOGIST: CPT | Performed by: PATHOLOGY

## 2025-02-04 ENCOUNTER — TELEPHONE (OUTPATIENT)
Age: 45
End: 2025-02-04

## 2025-02-04 NOTE — TELEPHONE ENCOUNTER
S/P = Exc of Bilateral Axilla Excess = 1/30/2025 = Dr. Lo    Received call from patient stating that she woke up Friday AM feeling like she had been hit by a truck. She states that she's has surgery prior to this but she just felt worse. Saturday was not much better. Patient states that she took a home COVID test and did test positive.    Patient states that Sunday and yesterday - she has had this headache - only one side, only one spot that doesn't seem to want to let go. Patient is unsure of if this is due to COVID OR POPV.    Patient is concerned.     Please return her call at your earliest convenience.    (MA unable at time of call.)

## 2025-02-06 NOTE — TELEPHONE ENCOUNTER
Called and spoke with patient and informed her per Miladys Orozco PA-C, patient okay to wait until visit tomorrow and if there is any fluid, possibly could be aspirated at the time of visit. Patient verbalized understanding.     FYI patient has tested positive for COVID, she will be day 7 tomorrow and agreed to wear a mask.

## 2025-02-06 NOTE — TELEPHONE ENCOUNTER
S/w Patient and she said that she hears sloshing on the left side. Contacted Miladys Orozco PA-C, she stated she would reach out to Dr. Lo. Informed patient I would call her back.

## 2025-02-07 ENCOUNTER — OFFICE VISIT (OUTPATIENT)
Dept: PLASTIC SURGERY | Facility: CLINIC | Age: 45
End: 2025-02-07

## 2025-02-07 DIAGNOSIS — Z90.13 ACQUIRED ABSENCE OF BILATERAL BREASTS AND NIPPLES: Primary | ICD-10-CM

## 2025-02-07 PROCEDURE — 99024 POSTOP FOLLOW-UP VISIT: CPT

## 2025-02-07 NOTE — PROGRESS NOTES
[facility] Plastic and Reconstructive Surgery  [address], [address], [place] [address]  [phone]    Patient Identification: Kelley Bey is a 44 y.o. female     History of Present Illness: The patient is a 44 y.o.  year-old female  who presents to the office for post-op visit. Patient is 8 days s/p Excision Of Bilateral Axillary Excess - Bilateral  on 1/30/2025 by Dr. Lo.  Patient states she recently tested positive for COVID, is feeling well overall, mostly fatigued. She feels as if there is fluid in her left breast. She is continuing to wear compression at all times.   Patient has no complaints at this time.    Past Medical History:   Diagnosis Date    Breast cancer (HCC) 2017    right breast    Cancer (HCC)     breast    Hypothyroidism         Review of Systems  Constitutional: Denies fevers, chills or pain.  Skin: Denies any warmth, erythema, edema or mucopurulent drainage.     Physical Exam    Breast: Steri strips in place. Surgical incisions are clean, dry, and intact. Skin perfusion is intact. Expected amount of post-operative edema. Small area of fluid palpated to the left lateral breast.    Attempted fluid aspiration with 18 gauge needle, 60cc syringe. Alcohol prepped skin. 0cc aspirated.     Assessment and Plan:  The patient is an 44 y.o.  year-old female who presents to the office for post-op visit. Patient is 8 days s/p Excision Of Bilateral Axillary Excess - Bilateral  on 1/30/2025 by Dr. Lo      -At today's visit incisions examined, healing well. Breast contour is flat. Small area of fluid palpated to the left lateral breast, attempted aspiration, 0cc aspirated. She is to continue with compression at all times, was given a binder to use on the chest. Call the office with concerns for infection.  -The patient is to return in 1 week for steri strip and suture removal  -The patient is to call the office with any questions or concerns. All of the patient's questions were  answered at this time and they agree with the plan of care.      Yeni Cadena PA-C  [facility] Plastic and Reconstructive Surgery

## 2025-02-12 ENCOUNTER — OFFICE VISIT (OUTPATIENT)
Dept: PLASTIC SURGERY | Facility: CLINIC | Age: 45
End: 2025-02-12

## 2025-02-12 DIAGNOSIS — Z48.89 OTHER SPECIFIED AFTERCARE FOLLOWING SURGERY: Primary | ICD-10-CM

## 2025-02-12 DIAGNOSIS — Z90.13 ACQUIRED ABSENCE OF BILATERAL BREASTS AND NIPPLES: ICD-10-CM

## 2025-02-12 PROCEDURE — 99024 POSTOP FOLLOW-UP VISIT: CPT

## 2025-02-12 NOTE — PROGRESS NOTES
[facility] Plastic and Reconstructive Surgery  [phone]    Patient Identification: Kelley Bey is a 44 y.o. female     History of Present Illness: The patient is a 44 y.o.  year-old female  who presents to the office for 2 week post op. Patient is 13 days s/p Excision Of Bilateral Axillary Excess - Bilateral  on 1/30/2025 by Dr. Lo. She reports feeling well since her last visit. Has concerns about a small area of the R incision that she thinks may have a hole. Denies drainage, erythema, or warmth. No fevers or chills. Last steri strip fell off yesterday.    Patient still feels small amount of fluid in the L axillary region. She has been wearing binder for compression. Aspiration was attempted by [facility] last visit without success, Dr. Inman states it was too small of an amount to aspirate.     Inquiring about when she can start yoga.    Past Medical History:   Diagnosis Date    Breast cancer (HCC) 2017    right breast    Cancer (HCC)     breast    Hypothyroidism           Review of Systems  Constitutional: Denies fevers, chills or pain.  Skin: Denies any warmth, erythema, edema or mucopurulent drainage.     Physical Exam  General: AAOx3, NAD, well appearing  Breast: Surgical incisions are clean, dry, and intact, healing well. L axillary region with small amount of fluid palpable. No erythema, or drainage. See media    Assessment and Plan:  The patient is an 44 y.o.  year-old female who presents to the office for post op. Patient is 13 days s/p Excision Of Bilateral Axillary Excess - Bilateral  on 1/30/2025 by Dr. Lo.    -At today's visit suture ends were cut  -Start scar massage while in the shower/post shower with Aquaphor  -Start to moisturize incisions with Aquaphor  -Continue compression with binder  -Discussed waiting to return to yoga until next visit, encouraged range of motion exercises  -Discussed PT referral, patient has never been. She will consider it and discuss more at next  appointment.  -The patient is to return in 2 weeks for follow up  -The patient is to call the office with any questions or concerns. All of the patient's questions were answered at this time and they agree with the plan of care.      Miladys Orozco PA-C  [facility] Plastic and Reconstructive Surgery

## 2025-02-24 ENCOUNTER — OFFICE VISIT (OUTPATIENT)
Age: 45
End: 2025-02-24
Payer: COMMERCIAL

## 2025-02-24 DIAGNOSIS — Z90.13 ACQUIRED ABSENCE OF BILATERAL BREASTS AND NIPPLES: Primary | ICD-10-CM

## 2025-02-24 PROCEDURE — 99024 POSTOP FOLLOW-UP VISIT: CPT

## 2025-02-24 PROCEDURE — 10160 PNXR ASPIR ABSC HMTMA BULLA: CPT

## 2025-03-21 ENCOUNTER — TELEPHONE (OUTPATIENT)
Age: 45
End: 2025-03-21

## 2025-03-21 NOTE — TELEPHONE ENCOUNTER
Spoke with patient in regards to red spot, advised to massage area and keep covered. Will check in on Monday, all questions answered.

## 2025-03-21 NOTE — TELEPHONE ENCOUNTER
Received call from patient stating that the area where Dr Lo went in with a needle to drain her incision is now red and a little bit of blood is coming out.    I sent patient the link for her to set up her mychart and advised her to upload a picture of the area.    Patient would like a call back [phone]

## 2025-06-19 ENCOUNTER — TELEPHONE (OUTPATIENT)
Facility: HOSPITAL | Age: 45
End: 2025-06-19

## 2025-06-19 LAB
DME PARACHUTE DELIVERY DATE REQUESTED: NORMAL
DME PARACHUTE ITEM DESCRIPTION: NORMAL
DME PARACHUTE ORDER STATUS: NORMAL
DME PARACHUTE SUPPLIER NAME: NORMAL
DME PARACHUTE SUPPLIER PHONE: NORMAL

## (undated) DEVICE — 3M™ STERI-STRIP™ REINFORCED ADHESIVE SKIN CLOSURES, R1547, 1/2 IN X 4 IN (12 MM X 100 MM), 6 STRIPS/ENVELOPE: Brand: 3M™ STERI-STRIP™

## (undated) DEVICE — GAUZE SPONGES,16 PLY: Brand: CURITY

## (undated) DEVICE — ANTIBACTERIAL UNDYED BRAIDED (POLYGLACTIN 910), SYNTHETIC ABSORBABLE SUTURE: Brand: COATED VICRYL

## (undated) DEVICE — BETHLEHEM UNIVERSAL BREAST PK: Brand: CARDINAL HEALTH

## (undated) DEVICE — SKIN MARKER DUAL TIP WITH RULER CAP, FLEXIBLE RULER AND LABELS: Brand: DEVON

## (undated) DEVICE — SMOKE EVAC FLUID SUCTION HEPA FILTER

## (undated) DEVICE — ABDOMINAL PAD: Brand: DERMACEA

## (undated) DEVICE — NEPTUNE E-SEP SMOKE EVACUATION PENCIL, COATED, 70MM BLADE, PUSH BUTTON SWITCH: Brand: NEPTUNE E-SEP

## (undated) DEVICE — SYRINGE 30ML LL

## (undated) DEVICE — INTENDED FOR TISSUE SEPARATION, AND OTHER PROCEDURES THAT REQUIRE A SHARP SURGICAL BLADE TO PUNCTURE OR CUT.: Brand: BARD-PARKER ® SAFETYLOCK CARBON RIB-BACK BLADES

## (undated) DEVICE — SCD SEQUENTIAL COMPRESSION COMFORT SLEEVE MEDIUM KNEE LENGTH: Brand: KENDALL SCD

## (undated) DEVICE — DISPOSABLE OR TOWEL: Brand: CARDINAL HEALTH

## (undated) DEVICE — STERILE POLYISOPRENE POWDER-FREE SURGICAL GLOVES WITH EMOLLIENT COATING: Brand: PROTEXIS

## (undated) DEVICE — PAD GROUNDING DUAL ADULT

## (undated) DEVICE — NEEDLE BLUNT 18 G X 1 1/2IN

## (undated) DEVICE — NEEDLE 21 G X 1 1/2 SAFETY

## (undated) DEVICE — EXOFIN PRECISION PEN HIGH VISCOSITY TOPICAL SKIN ADHESIVE: Brand: EXOFIN PRECISION PEN, 1G

## (undated) DEVICE — ELECTRODE EZ CLEAN BLADE -0012

## (undated) DEVICE — UNDYED MONOFILAMENT (POLYDIOXANONE), ABSORBABLE SURGICAL SUTURE: Brand: PDS

## (undated) DEVICE — SINGLE PORT MANIFOLD: Brand: NEPTUNE 2

## (undated) DEVICE — DRAPE SHEET THREE QUARTER

## (undated) DEVICE — GLOVE PI ULTRA TOUCH SZ 6